# Patient Record
Sex: FEMALE | Race: WHITE | Employment: OTHER | URBAN - METROPOLITAN AREA
[De-identification: names, ages, dates, MRNs, and addresses within clinical notes are randomized per-mention and may not be internally consistent; named-entity substitution may affect disease eponyms.]

---

## 2019-10-22 ENCOUNTER — OFFICE VISIT (OUTPATIENT)
Dept: PRIMARY CARE CLINIC | Age: 69
End: 2019-10-22
Payer: MEDICARE

## 2019-10-22 VITALS
DIASTOLIC BLOOD PRESSURE: 108 MMHG | RESPIRATION RATE: 18 BRPM | SYSTOLIC BLOOD PRESSURE: 182 MMHG | TEMPERATURE: 97.8 F | HEIGHT: 60 IN | BODY MASS INDEX: 26.42 KG/M2 | HEART RATE: 107 BPM | WEIGHT: 134.6 LBS | OXYGEN SATURATION: 98 %

## 2019-10-22 DIAGNOSIS — Z12.11 SCREEN FOR COLON CANCER: ICD-10-CM

## 2019-10-22 DIAGNOSIS — Z91.199 NONCOMPLIANCE: ICD-10-CM

## 2019-10-22 DIAGNOSIS — E78.5 DYSLIPIDEMIA: ICD-10-CM

## 2019-10-22 DIAGNOSIS — Z12.31 SCREENING MAMMOGRAM, ENCOUNTER FOR: ICD-10-CM

## 2019-10-22 DIAGNOSIS — I10 ESSENTIAL HYPERTENSION: Primary | ICD-10-CM

## 2019-10-22 DIAGNOSIS — F41.9 ANXIETY: ICD-10-CM

## 2019-10-22 DIAGNOSIS — E55.9 VITAMIN D DEFICIENCY: ICD-10-CM

## 2019-10-22 DIAGNOSIS — E04.2 MULTINODULAR GOITER: ICD-10-CM

## 2019-10-22 DIAGNOSIS — Z76.89 ENCOUNTER TO ESTABLISH CARE: ICD-10-CM

## 2019-10-22 PROCEDURE — 99203 OFFICE O/P NEW LOW 30 MIN: CPT | Performed by: NURSE PRACTITIONER

## 2019-10-22 RX ORDER — HYDROCHLOROTHIAZIDE 12.5 MG/1
12.5 CAPSULE, GELATIN COATED ORAL EVERY MORNING
Qty: 90 CAPSULE | Refills: 1 | Status: SHIPPED | OUTPATIENT
Start: 2019-10-22 | End: 2020-01-07 | Stop reason: ALTCHOICE

## 2019-10-22 RX ORDER — BLOOD PRESSURE TEST KIT
1 KIT MISCELLANEOUS 2 TIMES DAILY
Qty: 1 KIT | Refills: 0 | Status: SHIPPED | OUTPATIENT
Start: 2019-10-22 | End: 2020-01-07

## 2019-10-22 RX ORDER — LATANOPROST 50 UG/ML
SOLUTION/ DROPS OPHTHALMIC
COMMUNITY
Start: 2019-07-31 | End: 2020-01-07

## 2019-10-22 RX ORDER — AMLODIPINE BESYLATE 5 MG/1
5 TABLET ORAL DAILY
Qty: 90 TABLET | Refills: 1 | Status: SHIPPED | OUTPATIENT
Start: 2019-10-22 | End: 2019-11-06

## 2019-10-22 ASSESSMENT — PATIENT HEALTH QUESTIONNAIRE - PHQ9
SUM OF ALL RESPONSES TO PHQ QUESTIONS 1-9: 0
SUM OF ALL RESPONSES TO PHQ QUESTIONS 1-9: 0
1. LITTLE INTEREST OR PLEASURE IN DOING THINGS: 0
SUM OF ALL RESPONSES TO PHQ9 QUESTIONS 1 & 2: 0
2. FEELING DOWN, DEPRESSED OR HOPELESS: 0

## 2019-10-22 ASSESSMENT — ENCOUNTER SYMPTOMS
ORTHOPNEA: 0
WHEEZING: 0
ANAL BLEEDING: 0
SHORTNESS OF BREATH: 0
DIARRHEA: 0
COUGH: 0
RHINORRHEA: 0
ABDOMINAL PAIN: 0
SINUS PRESSURE: 0
SINUS PAIN: 0
EYE REDNESS: 0
BLURRED VISION: 0
NAUSEA: 0
VOMITING: 0
CONSTIPATION: 0
PHOTOPHOBIA: 0

## 2019-10-25 PROBLEM — I10 ESSENTIAL HYPERTENSION: Status: ACTIVE | Noted: 2019-10-25

## 2019-10-25 PROBLEM — F41.9 ANXIETY: Status: ACTIVE | Noted: 2019-10-25

## 2019-10-25 PROBLEM — E78.5 DYSLIPIDEMIA: Status: ACTIVE | Noted: 2019-10-25

## 2019-10-25 PROBLEM — E55.9 VITAMIN D DEFICIENCY: Status: ACTIVE | Noted: 2019-10-25

## 2019-10-29 ENCOUNTER — TELEPHONE (OUTPATIENT)
Dept: PRIMARY CARE CLINIC | Age: 69
End: 2019-10-29

## 2019-10-29 ENCOUNTER — HOSPITAL ENCOUNTER (OUTPATIENT)
Age: 69
Discharge: HOME | End: 2019-10-29
Payer: MEDICARE

## 2019-10-29 DIAGNOSIS — E87.6 HYPOKALEMIA: Primary | ICD-10-CM

## 2019-10-29 DIAGNOSIS — I10 ESSENTIAL HYPERTENSION: ICD-10-CM

## 2019-10-29 DIAGNOSIS — E04.2 MULTINODULAR GOITER: ICD-10-CM

## 2019-10-29 DIAGNOSIS — Z12.11 SCREEN FOR COLON CANCER: ICD-10-CM

## 2019-10-29 DIAGNOSIS — R19.5 POSITIVE FIT (FECAL IMMUNOCHEMICAL TEST): Primary | ICD-10-CM

## 2019-10-29 DIAGNOSIS — E78.5 DYSLIPIDEMIA: ICD-10-CM

## 2019-10-29 DIAGNOSIS — E55.9 VITAMIN D DEFICIENCY: ICD-10-CM

## 2019-10-29 DIAGNOSIS — F41.9 ANXIETY: ICD-10-CM

## 2019-10-29 LAB
ABSOLUTE EOS #: 0.08 K/UL (ref 0–0.44)
ABSOLUTE IMMATURE GRANULOCYTE: 0.06 K/UL (ref 0–0.3)
ABSOLUTE LYMPH #: 2.09 K/UL (ref 1.1–3.7)
ABSOLUTE MONO #: 0.73 K/UL (ref 0.1–1.2)
ALBUMIN SERPL-MCNC: 4.5 G/DL (ref 3.5–5.2)
ALBUMIN/GLOBULIN RATIO: 1.3 (ref 1–2.5)
ALP BLD-CCNC: 72 U/L (ref 35–104)
ALT SERPL-CCNC: 15 U/L (ref 5–33)
ANION GAP SERPL CALCULATED.3IONS-SCNC: 14 MMOL/L (ref 9–17)
AST SERPL-CCNC: 19 U/L
BASOPHILS # BLD: 1 % (ref 0–2)
BASOPHILS ABSOLUTE: 0.04 K/UL (ref 0–0.2)
BILIRUB SERPL-MCNC: 0.47 MG/DL (ref 0.3–1.2)
BUN BLDV-MCNC: 13 MG/DL (ref 8–23)
BUN/CREAT BLD: 29 (ref 9–20)
CALCIUM SERPL-MCNC: 9.9 MG/DL (ref 8.6–10.4)
CHLORIDE BLD-SCNC: 100 MMOL/L (ref 98–107)
CHOLESTEROL/HDL RATIO: 3.3
CHOLESTEROL: 221 MG/DL
CO2: 27 MMOL/L (ref 20–31)
CONTROL: PRESENT
CREAT SERPL-MCNC: 0.45 MG/DL (ref 0.5–0.9)
DIFFERENTIAL TYPE: ABNORMAL
EOSINOPHILS RELATIVE PERCENT: 1 % (ref 1–4)
GFR AFRICAN AMERICAN: >60 ML/MIN
GFR NON-AFRICAN AMERICAN: >60 ML/MIN
GFR SERPL CREATININE-BSD FRML MDRD: ABNORMAL ML/MIN/{1.73_M2}
GFR SERPL CREATININE-BSD FRML MDRD: ABNORMAL ML/MIN/{1.73_M2}
GLUCOSE BLD-MCNC: 102 MG/DL (ref 70–99)
HCT VFR BLD CALC: 42 % (ref 36.3–47.1)
HDLC SERPL-MCNC: 67 MG/DL
HEMOCCULT STL QL: POSITIVE
HEMOGLOBIN: 13.7 G/DL (ref 11.9–15.1)
IMMATURE GRANULOCYTES: 1 %
LDL CHOLESTEROL: 116 MG/DL (ref 0–130)
LYMPHOCYTES # BLD: 27 % (ref 24–43)
MCH RBC QN AUTO: 31.9 PG (ref 25.2–33.5)
MCHC RBC AUTO-ENTMCNC: 32.6 G/DL (ref 28.4–34.8)
MCV RBC AUTO: 97.7 FL (ref 82.6–102.9)
MONOCYTES # BLD: 9 % (ref 3–12)
NRBC AUTOMATED: 0 PER 100 WBC
PDW BLD-RTO: 12 % (ref 11.8–14.4)
PLATELET # BLD: 241 K/UL (ref 138–453)
PLATELET ESTIMATE: ABNORMAL
PMV BLD AUTO: 10 FL (ref 8.1–13.5)
POTASSIUM SERPL-SCNC: 3.6 MMOL/L (ref 3.7–5.3)
RBC # BLD: 4.3 M/UL (ref 3.95–5.11)
RBC # BLD: ABNORMAL 10*6/UL
SEG NEUTROPHILS: 61 % (ref 36–65)
SEGMENTED NEUTROPHILS ABSOLUTE COUNT: 4.82 K/UL (ref 1.5–8.1)
SODIUM BLD-SCNC: 141 MMOL/L (ref 135–144)
TOTAL PROTEIN: 8.1 G/DL (ref 6.4–8.3)
TRIGL SERPL-MCNC: 191 MG/DL
TSH SERPL DL<=0.05 MIU/L-ACNC: 3.24 MIU/L (ref 0.3–5)
VITAMIN D 25-HYDROXY: 21.1 NG/ML (ref 30–100)
VLDLC SERPL CALC-MCNC: ABNORMAL MG/DL (ref 1–30)
WBC # BLD: 7.8 K/UL (ref 3.5–11.3)
WBC # BLD: ABNORMAL 10*3/UL

## 2019-10-29 PROCEDURE — 80053 COMPREHEN METABOLIC PANEL: CPT

## 2019-10-29 PROCEDURE — 82306 VITAMIN D 25 HYDROXY: CPT

## 2019-10-29 PROCEDURE — 84443 ASSAY THYROID STIM HORMONE: CPT

## 2019-10-29 PROCEDURE — 85025 COMPLETE CBC W/AUTO DIFF WBC: CPT

## 2019-10-29 PROCEDURE — 82274 ASSAY TEST FOR BLOOD FECAL: CPT | Performed by: NURSE PRACTITIONER

## 2019-10-29 PROCEDURE — 36415 COLL VENOUS BLD VENIPUNCTURE: CPT

## 2019-10-29 PROCEDURE — 80061 LIPID PANEL: CPT

## 2019-10-29 RX ORDER — SODIUM, POTASSIUM,MAG SULFATES 17.5-3.13G
SOLUTION, RECONSTITUTED, ORAL ORAL
Qty: 2 BOTTLE | Refills: 0 | Status: SHIPPED | OUTPATIENT
Start: 2019-10-29 | End: 2020-01-07 | Stop reason: ALTCHOICE

## 2019-10-30 ENCOUNTER — TELEPHONE (OUTPATIENT)
Dept: PRIMARY CARE CLINIC | Age: 69
End: 2019-10-30

## 2019-10-30 RX ORDER — VITAMIN B COMPLEX
1000 TABLET ORAL DAILY
Qty: 90 TABLET | Refills: 1 | Status: SHIPPED | OUTPATIENT
Start: 2019-10-30 | End: 2021-09-23

## 2019-11-06 ENCOUNTER — OFFICE VISIT (OUTPATIENT)
Dept: PRIMARY CARE CLINIC | Age: 69
End: 2019-11-06
Payer: MEDICARE

## 2019-11-06 VITALS
TEMPERATURE: 97.8 F | RESPIRATION RATE: 20 BRPM | HEIGHT: 60 IN | WEIGHT: 134.3 LBS | HEART RATE: 96 BPM | SYSTOLIC BLOOD PRESSURE: 156 MMHG | BODY MASS INDEX: 26.37 KG/M2 | DIASTOLIC BLOOD PRESSURE: 97 MMHG

## 2019-11-06 DIAGNOSIS — Z00.00 ROUTINE GENERAL MEDICAL EXAMINATION AT A HEALTH CARE FACILITY: Primary | ICD-10-CM

## 2019-11-06 DIAGNOSIS — I10 ESSENTIAL HYPERTENSION: ICD-10-CM

## 2019-11-06 PROCEDURE — G0438 PPPS, INITIAL VISIT: HCPCS | Performed by: NURSE PRACTITIONER

## 2019-11-06 RX ORDER — AMLODIPINE BESYLATE 10 MG/1
10 TABLET ORAL DAILY
Qty: 90 TABLET | Refills: 1 | Status: SHIPPED | OUTPATIENT
Start: 2019-11-06 | End: 2020-05-04

## 2019-11-06 ASSESSMENT — PATIENT HEALTH QUESTIONNAIRE - PHQ9
SUM OF ALL RESPONSES TO PHQ QUESTIONS 1-9: 0

## 2019-11-06 ASSESSMENT — LIFESTYLE VARIABLES: HOW OFTEN DO YOU HAVE A DRINK CONTAINING ALCOHOL: 0

## 2019-12-05 ENCOUNTER — TELEPHONE (OUTPATIENT)
Dept: PRIMARY CARE CLINIC | Age: 69
End: 2019-12-05

## 2019-12-05 ENCOUNTER — HOSPITAL ENCOUNTER (OUTPATIENT)
Dept: WOMENS IMAGING | Age: 69
Discharge: HOME | End: 2019-12-07
Payer: MEDICARE

## 2019-12-05 ENCOUNTER — APPOINTMENT (OUTPATIENT)
Dept: WOMENS IMAGING | Age: 69
End: 2019-12-05
Payer: MEDICARE

## 2019-12-05 DIAGNOSIS — Z12.31 SCREENING MAMMOGRAM, ENCOUNTER FOR: ICD-10-CM

## 2019-12-05 PROCEDURE — 77063 BREAST TOMOSYNTHESIS BI: CPT

## 2020-01-07 ENCOUNTER — OFFICE VISIT (OUTPATIENT)
Dept: PRIMARY CARE CLINIC | Age: 70
End: 2020-01-07
Payer: MEDICARE

## 2020-01-07 VITALS
BODY MASS INDEX: 26.8 KG/M2 | SYSTOLIC BLOOD PRESSURE: 124 MMHG | HEIGHT: 60 IN | WEIGHT: 136.5 LBS | DIASTOLIC BLOOD PRESSURE: 71 MMHG | HEART RATE: 91 BPM | TEMPERATURE: 98.7 F | RESPIRATION RATE: 16 BRPM

## 2020-01-07 PROCEDURE — 99214 OFFICE O/P EST MOD 30 MIN: CPT | Performed by: NURSE PRACTITIONER

## 2020-01-07 RX ORDER — LEVOTHYROXINE SODIUM 0.07 MG/1
TABLET ORAL
COMMUNITY
Start: 2019-11-22

## 2020-01-07 RX ORDER — TIMOLOL MALEATE 5 MG/ML
SOLUTION/ DROPS OPHTHALMIC
COMMUNITY
Start: 2019-12-06 | End: 2021-09-23

## 2020-01-07 ASSESSMENT — PATIENT HEALTH QUESTIONNAIRE - PHQ9
2. FEELING DOWN, DEPRESSED OR HOPELESS: 0
SUM OF ALL RESPONSES TO PHQ9 QUESTIONS 1 & 2: 0
SUM OF ALL RESPONSES TO PHQ QUESTIONS 1-9: 0
1. LITTLE INTEREST OR PLEASURE IN DOING THINGS: 0
SUM OF ALL RESPONSES TO PHQ QUESTIONS 1-9: 0

## 2020-01-07 ASSESSMENT — ENCOUNTER SYMPTOMS
ABDOMINAL PAIN: 0
NAUSEA: 0
SHORTNESS OF BREATH: 0
TROUBLE SWALLOWING: 0
SORE THROAT: 1
DIARRHEA: 0
SINUS PAIN: 0
VOMITING: 0
COUGH: 1
WHEEZING: 0
RHINORRHEA: 1
SINUS PRESSURE: 0

## 2020-01-07 NOTE — PATIENT INSTRUCTIONS
stroke. But taking aspirin isn't right for everyone, because it can cause serious bleeding. · See your doctor regularly. You may need to see the doctor more often at first or until your blood pressure comes down. · If you are taking blood pressure medicine, talk to your doctor before you take decongestants or anti-inflammatory medicine, such as ibuprofen. Some of these medicines can raise blood pressure. · Learn how to check your blood pressure at home. Lifestyle changes  · Stay at a healthy weight. This is especially important if you put on weight around the waist. Losing even 10 pounds can help you lower your blood pressure. · If your doctor recommends it, get more exercise. Walking is a good choice. Bit by bit, increase the amount you walk every day. Try for at least 30 minutes on most days of the week. You also may want to swim, bike, or do other activities. · Avoid or limit alcohol. Talk to your doctor about whether you can drink any alcohol. · Try to limit how much sodium you eat to less than 2,300 milligrams (mg) a day. Your doctor may ask you to try to eat less than 1,500 mg a day. · Eat plenty of fruits (such as bananas and oranges), vegetables, legumes, whole grains, and low-fat dairy products. · Lower the amount of saturated fat in your diet. Saturated fat is found in animal products such as milk, cheese, and meat. Limiting these foods may help you lose weight and also lower your risk for heart disease. · Do not smoke. Smoking increases your risk for heart attack and stroke. If you need help quitting, talk to your doctor about stop-smoking programs and medicines. These can increase your chances of quitting for good. When should you call for help? Call 911 anytime you think you may need emergency care. This may mean having symptoms that suggest that your blood pressure is causing a serious heart or blood vessel problem.  Your blood pressure may be over 180/120.   For example, call 911 if:    · You have symptoms of a heart attack. These may include:  ? Chest pain or pressure, or a strange feeling in the chest.  ? Sweating. ? Shortness of breath. ? Nausea or vomiting. ? Pain, pressure, or a strange feeling in the back, neck, jaw, or upper belly or in one or both shoulders or arms. ? Lightheadedness or sudden weakness. ? A fast or irregular heartbeat.     · You have symptoms of a stroke. These may include:  ? Sudden numbness, tingling, weakness, or loss of movement in your face, arm, or leg, especially on only one side of your body. ? Sudden vision changes. ? Sudden trouble speaking. ? Sudden confusion or trouble understanding simple statements. ? Sudden problems with walking or balance. ? A sudden, severe headache that is different from past headaches.     · You have severe back or belly pain.    Do not wait until your blood pressure comes down on its own. Get help right away.   Call your doctor now or seek immediate care if:    · Your blood pressure is much higher than normal (such as 180/120 or higher), but you don't have symptoms.     · You think high blood pressure is causing symptoms, such as:  ? Severe headache.  ? Blurry vision.    Watch closely for changes in your health, and be sure to contact your doctor if:    · Your blood pressure measures higher than your doctor recommends at least 2 times. That means the top number is higher or the bottom number is higher, or both.     · You think you may be having side effects from your blood pressure medicine. Where can you learn more? Go to https://[employer].intelloCut. org and sign in to your [employer] account. Enter H000 in the [facility] box to learn more about \"High Blood Pressure: Care Instructions. \"     If you do not have an account, please click on the \"Sign Up Now\" link. Current as of: July 22, 2018  Content Version: 12.1  © 1998-3311 Healthwise, mascotsecret.  Care instructions adapted under license by [id number] [employer]

## 2020-01-22 ENCOUNTER — NURSE ONLY (OUTPATIENT)
Dept: PRIMARY CARE CLINIC | Age: 70
End: 2020-01-22

## 2020-01-22 VITALS — DIASTOLIC BLOOD PRESSURE: 84 MMHG | SYSTOLIC BLOOD PRESSURE: 120 MMHG | HEART RATE: 68 BPM

## 2020-05-04 RX ORDER — HYDROCHLOROTHIAZIDE 12.5 MG/1
CAPSULE, GELATIN COATED ORAL
Qty: 90 CAPSULE | Refills: 0 | OUTPATIENT
Start: 2020-05-04

## 2020-05-04 RX ORDER — AMLODIPINE BESYLATE 10 MG/1
TABLET ORAL
Qty: 90 TABLET | Refills: 3 | Status: SHIPPED | OUTPATIENT
Start: 2020-05-04 | End: 2020-07-08

## 2020-05-04 NOTE — TELEPHONE ENCOUNTER
The HCTZ is marked therapy completed on medication list.  Request was sent by pharmacy and can not remove with out signing the other order.                Health Maintenance   Topic Date Due    Pneumococcal 65+ years Vaccine (1 of 1 - PPSV23) 10/02/2020 (Originally 2/7/2017)    DEXA (modify frequency per FRAX score)  10/22/2020 (Originally 2/7/2007)    DTaP/Tdap/Td vaccine (1 - Tdap) 10/22/2020 (Originally 2/7/1971)    Flu vaccine (Season Ended) 10/22/2020 (Originally 9/1/2020)    Shingles Vaccine (1 of 2) 10/22/2020 (Originally 2/7/2002)    Hepatitis C screen  10/22/2018 (Originally 1950)    Diabetes screen  11/06/2020 (Originally 2/7/1992)    Colon Cancer Screen FIT/FOBT  10/29/2020    Potassium monitoring  10/29/2020    Creatinine monitoring  10/29/2020    Annual Wellness Visit (AWV)  11/06/2020    Breast cancer screen  12/05/2021    Lipid screen  10/29/2024    Hepatitis A vaccine  Aged Out    Hepatitis B vaccine  Aged Out    Hib vaccine  Aged Out    Meningococcal (ACWY) vaccine  Aged Out             (applicable per patient's age: Cancer Screenings, Depression Screening, Fall Risk Screening, Immunizations)    LDL Cholesterol (mg/dL)   Date Value   10/29/2019 116     AST (U/L)   Date Value   10/29/2019 19     ALT (U/L)   Date Value   10/29/2019 15     BUN (mg/dL)   Date Value   10/29/2019 13      (goal A1C is < 7)   (goal LDL is <100) need 30-50% reduction from baseline     BP Readings from Last 3 Encounters:   01/22/20 120/84   01/07/20 124/71   11/06/19 (!) 156/97    (goal /80)      All Future Testing planned in [facility]:  Lab Frequency Next Occurrence   COLONOSCOPY W/ OR W/O BIOPSY Once 10/29/2019   CBC Auto Differential Once 07/05/2020   ALT Once 07/07/2020   AST Once 03/67/0832   Basic Metabolic Panel Once 81/20/7350   Lipid Panel Once 07/05/2020   T4, Free Once 07/05/2020   TSH without Reflex Once 07/05/2020       Next Visit Date:  Future Appointments   Date Time Provider Department [place]   7/7/2020  8:40 AM CHRISTIANO Boogie CNP [facility]   11/10/2020  9:00 AM CHRISTIANO Boogie CNP [facility] [place]            Patient Active Problem List:     Multinodular goiter     Hypertriglyceridemia     Chondromalacia of knee     Elevated blood pressure reading     Essential hypertension     Dyslipidemia     Vitamin D deficiency     Anxiety     Positive FIT (fecal immunochemical test)

## 2020-06-29 ENCOUNTER — TELEPHONE (OUTPATIENT)
Dept: PRIMARY CARE CLINIC | Age: 70
End: 2020-06-29

## 2020-06-29 NOTE — TELEPHONE ENCOUNTER
Attempted to call patient regarding need to get fasting albs done for upcoming appt. No answer and no voice mail.

## 2020-07-08 ENCOUNTER — OFFICE VISIT (OUTPATIENT)
Dept: PRIMARY CARE CLINIC | Age: 70
End: 2020-07-08
Payer: MEDICARE

## 2020-07-08 VITALS
WEIGHT: 136.4 LBS | RESPIRATION RATE: 18 BRPM | DIASTOLIC BLOOD PRESSURE: 83 MMHG | SYSTOLIC BLOOD PRESSURE: 127 MMHG | HEART RATE: 98 BPM | TEMPERATURE: 97.8 F | BODY MASS INDEX: 26.64 KG/M2

## 2020-07-08 PROCEDURE — 99214 OFFICE O/P EST MOD 30 MIN: CPT | Performed by: NURSE PRACTITIONER

## 2020-07-08 RX ORDER — BENAZEPRIL HYDROCHLORIDE 20 MG/1
20 TABLET ORAL DAILY
Qty: 90 TABLET | Refills: 3 | Status: SHIPPED | OUTPATIENT
Start: 2020-07-08 | End: 2020-09-17

## 2020-07-08 ASSESSMENT — ENCOUNTER SYMPTOMS
ABDOMINAL PAIN: 0
SHORTNESS OF BREATH: 0
WHEEZING: 0
DIARRHEA: 0
VOMITING: 0
COUGH: 0
SORE THROAT: 0
RHINORRHEA: 0
NAUSEA: 0
CONSTIPATION: 0

## 2020-07-08 NOTE — PATIENT INSTRUCTIONS
SURVEY:    You may be receiving a survey from [employer] regarding your visit today. Please complete the survey to enable us to provide the highest quality of care to you and your family. If you cannot score us a very good on any question, please call the office to discuss how we could have made your experience a very good one. Thank you. Patient Education        High Blood Pressure: Care Instructions  Overview     It's normal for blood pressure to go up and down throughout the day. But if it stays up, you have high blood pressure. Another name for high blood pressure is hypertension. Despite what a lot of people think, high blood pressure usually doesn't cause headaches or make you feel dizzy or lightheaded. It usually has no symptoms. But it does increase your risk of stroke, heart attack, and other problems. You and your doctor will talk about your risks of these problems based on your blood pressure. Your doctor will give you a goal for your blood pressure. Your goal will be based on your health and your age. Lifestyle changes, such as eating healthy and being active, are always important to help lower blood pressure. You might also take medicine to reach your blood pressure goal.  Follow-up care is a key part of your treatment and safety. Be sure to make and go to all appointments, and call your doctor if you are having problems. It's also a good idea to know your test results and keep a list of the medicines you take. How can you care for yourself at home? Medical treatment  · If you stop taking your medicine, your blood pressure will go back up. You may take one or more types of medicine to lower your blood pressure. Be safe with medicines. Take your medicine exactly as prescribed. Call your doctor if you think you are having a problem with your medicine. · Talk to your doctor before you start taking aspirin every day.  Aspirin can help certain people lower their risk of a heart attack or if:  · You have symptoms of a heart attack. These may include:  ? Chest pain or pressure, or a strange feeling in the chest.  ? Sweating. ? Shortness of breath. ? Nausea or vomiting. ? Pain, pressure, or a strange feeling in the back, neck, jaw, or upper belly or in one or both shoulders or arms. ? Lightheadedness or sudden weakness. ? A fast or irregular heartbeat. · You have symptoms of a stroke. These may include:  ? Sudden numbness, tingling, weakness, or loss of movement in your face, arm, or leg, especially on only one side of your body. ? Sudden vision changes. ? Sudden trouble speaking. ? Sudden confusion or trouble understanding simple statements. ? Sudden problems with walking or balance. ? A sudden, severe headache that is different from past headaches. · You have severe back or belly pain. Do not wait until your blood pressure comes down on its own. Get help right away. Call your doctor now or seek immediate care if:  · Your blood pressure is much higher than normal (such as 180/120 or higher), but you don't have symptoms. · You think high blood pressure is causing symptoms, such as:  ? Severe headache.  ? Blurry vision. Watch closely for changes in your health, and be sure to contact your doctor if:  · Your blood pressure measures higher than your doctor recommends at least 2 times. That means the top number is higher or the bottom number is higher, or both. · You think you may be having side effects from your blood pressure medicine. Where can you learn more? Go to https://TaodyneluiJounce.Oobafit. org and sign in to your [employer] account. Enter M850 in the [employer] box to learn more about \"High Blood Pressure: Care Instructions. \"     If you do not have an account, please click on the \"Sign Up Now\" link. Current as of: December 16, 2019               Content Version: 12.5  © 7470-7180 Healthwise, Incorporated.    Care instructions adapted under license by [facility]. If you have questions about a medical condition or this instruction, always ask your healthcare professional. [place] [address] any warranty or liability for your use of this information.

## 2020-07-08 NOTE — PROGRESS NOTES
Name: Chantel Sales  : 1950         Chief Complaint:     Chief Complaint   Patient presents with    Hypertension     routine check       History of Present Illness:      Chantel Sales is a 78 y.o.  female who presents with Hypertension (routine check)      Maira Khan is here today for a routine office visit. Hypertension-stable, patient wishes to stop amlodipine therapy. She states she is continuing with lower leg edema and body aches. She would like to switch to \"lisinopril\". She does state that this medication is known to cause cough but she would like to try something like this medication instead of the amlodipine. Hypothyroidism-stable, patient compliant with her medication administration. Denies any excessive fatigue daytime sleepiness, insomnia, anxiety, or agitation. Most recent labs were within normal limits. Hypertension   This is a chronic problem. The current episode started more than 1 year ago. The problem is unchanged. The problem is controlled. Pertinent negatives include no anxiety, chest pain, headaches, neck pain, palpitations or shortness of breath. There are no associated agents to hypertension. Risk factors for coronary artery disease include stress, post-menopausal state and family history. Past treatments include calcium channel blockers. The current treatment provides moderate improvement. There are no compliance problems. There is no history of kidney disease, CAD/MI, CVA or heart failure. There is no history of chronic renal disease. Past Medical History:     Past Medical History:   Diagnosis Date    GERD (gastroesophageal reflux disease)     Hypertension       Reviewed all health maintenance requirements and ordered appropriate tests  There are no preventive care reminders to display for this patient.     Past Surgical History:     Past Surgical History:   Procedure Laterality Date    BREAST BIOPSY      TONSILLECTOMY      TUBAL LIGATION          Medications: Prior to Admission medications    Medication Sig Start Date End Date Taking? Authorizing Provider   benazepril (LOTENSIN) 20 MG tablet Take 1 tablet by mouth daily 7/8/20  Yes CHRISTIANO Cruz CNP   levothyroxine (SYNTHROID) 75 MCG tablet TAKE 1 TABLET BY MOUTH ONCE DAILY ON AN EMPTY STOMACH 30 MINUTES BEFORE BREAKFAST WITH WATER ONLY 11/22/19  Yes Historical Provider, MD   Vitamin D (CHOLECALCIFEROL) 25 MCG (1000 UT) TABS tablet Take 1 tablet by mouth daily 10/30/19  Yes CHRISTIANO Santos CNP   timolol (TIMOPTIC) 0.5 % ophthalmic solution INSTILL 1 DROP INTO RIGHT EYE TWICE DAILY 12/6/19   Historical Provider, MD   Selenium 200 MCG TABS Take 1 tablet by mouth daily    Historical Provider, MD        Allergies:       Patient has no known allergies. Social History:     Tobacco:    reports that she has never smoked. She has never used smokeless tobacco.  Alcohol:      reports no history of alcohol use. Drug Use:  reports no history of drug use. Family History:     Family History   Problem Relation Age of Onset    High Blood Pressure Father     High Blood Pressure Sister     High Blood Pressure Other        Review of Systems:     Positive and Negative as described in HPI    Review of Systems   Constitutional: Negative for chills, fatigue and fever. HENT: Negative for congestion, rhinorrhea and sore throat. Eyes: Negative for visual disturbance. Respiratory: Negative for cough, shortness of breath and wheezing. Cardiovascular: Positive for leg swelling. Negative for chest pain and palpitations. Gastrointestinal: Negative for abdominal pain, constipation, diarrhea, nausea and vomiting. Genitourinary: Negative for difficulty urinating and dysuria. Musculoskeletal: Positive for myalgias. Negative for gait problem, neck pain and neck stiffness. Skin: Negative for rash. Neurological: Negative for dizziness, syncope, light-headedness and headaches.        Physical Exam:   Vitals:  BP 127/83 (Site: Left Upper Arm, Position: Sitting, Cuff Size: Medium Adult)   Pulse 98   Temp 97.8 °F (36.6 °C) (Temporal)   Resp 18   Wt 136 lb 6.4 oz (61.9 kg)   BMI 26.64 kg/m²     Physical Exam  Vitals signs and nursing note reviewed. Constitutional:       General: She is not in acute distress. Appearance: Normal appearance. She is not ill-appearing. HENT:      Right Ear: Tympanic membrane normal.      Left Ear: Tympanic membrane normal.      Mouth/Throat:      Mouth: Mucous membranes are moist.   Eyes:      General: No scleral icterus. Conjunctiva/sclera: Conjunctivae normal.   Neck:      Musculoskeletal: Normal range of motion and neck supple. Cardiovascular:      Rate and Rhythm: Normal rate and regular rhythm. Heart sounds: No murmur. Pulmonary:      Effort: Pulmonary effort is normal.      Breath sounds: Normal breath sounds. No wheezing. Abdominal:      General: Bowel sounds are normal. There is no distension. Palpations: Abdomen is soft. Tenderness: There is no abdominal tenderness. Musculoskeletal:      Right lower leg: No edema. Left lower leg: No edema. Lymphadenopathy:      Cervical: No cervical adenopathy. Skin:     General: Skin is warm and dry. Findings: No erythema. Neurological:      Mental Status: She is alert and oriented to person, place, and time.    Psychiatric:         Mood and Affect: Mood normal.         Behavior: Behavior normal.         Data:     Lab Results   Component Value Date     10/29/2019    K 3.6 10/29/2019     10/29/2019    CO2 27 10/29/2019    BUN 13 10/29/2019    CREATININE 0.45 10/29/2019    GLUCOSE 102 10/29/2019    PROT 8.1 10/29/2019    LABALBU 4.5 10/29/2019    BILITOT 0.47 10/29/2019    ALKPHOS 72 10/29/2019    AST 19 10/29/2019    ALT 15 10/29/2019     Lab Results   Component Value Date    WBC 7.8 10/29/2019    RBC 4.30 10/29/2019    HGB 13.7 10/29/2019    HCT 42.0 10/29/2019    MCV 97.7 10/29/2019    [facility]

## 2020-07-13 ENCOUNTER — TELEPHONE (OUTPATIENT)
Dept: PRIMARY CARE CLINIC | Age: 70
End: 2020-07-13

## 2020-07-16 LAB
ALT SERPL-CCNC: 15 U/L (ref 0–31)
ANION GAP SERPL CALCULATED.3IONS-SCNC: 12 MMOL/L (ref 5–15)
AST SERPL-CCNC: 19 U/L (ref 0–41)
BUN BLDV-MCNC: 11 MG/DL (ref 5–27)
CALCIUM SERPL-MCNC: 9.2 MG/DL (ref 8.5–10.5)
CHLORIDE BLD-SCNC: 103 MMOL/L (ref 98–109)
CHOLESTEROL/HDL RATIO: 3.9 (ref 1–5)
CHOLESTEROL: 239 MG/DL (ref 150–200)
CO2: 26 MMOL/L (ref 22–32)
CREAT SERPL-MCNC: 0.51 MG/DL (ref 0.4–1)
EGFR AFRICAN AMERICAN: >60 ML/MIN/1.73SQ.M
EGFR IF NONAFRICAN AMERICAN: >60 ML/MIN/1.73SQ.M
GLUCOSE: 95 MG/DL (ref 65–99)
HDLC SERPL-MCNC: 61 MG/DL
LDL CHOLESTEROL CALCULATED: 150 MG/DL
LDL/HDL RATIO: 2.5
POTASSIUM SERPL-SCNC: 3.9 MMOL/L (ref 3.5–5)
SODIUM BLD-SCNC: 141 MMOL/L (ref 134–146)
T4 FREE: 1.13 NG/DL (ref 0.61–1.6)
TRIGL SERPL-MCNC: 142 MG/DL (ref 27–150)
TSH SERPL DL<=0.05 MIU/L-ACNC: 0.08 UIU/ML (ref 0.49–4.67)
VLDLC SERPL CALC-MCNC: 28 MG/DL (ref 0–30)

## 2020-07-17 ENCOUNTER — TELEPHONE (OUTPATIENT)
Dept: PRIMARY CARE CLINIC | Age: 70
End: 2020-07-17

## 2020-07-17 NOTE — TELEPHONE ENCOUNTER
----- Message from [address] [place] [address], CHRISTIANO - CNP sent at 7/17/2020 11:39 AM EDT -----  Results are stable, please call patient and make them aware.

## 2020-07-22 ENCOUNTER — NURSE ONLY (OUTPATIENT)
Dept: PRIMARY CARE CLINIC | Age: 70
End: 2020-07-22

## 2020-07-22 VITALS
HEIGHT: 60 IN | HEART RATE: 82 BPM | RESPIRATION RATE: 16 BRPM | DIASTOLIC BLOOD PRESSURE: 79 MMHG | SYSTOLIC BLOOD PRESSURE: 143 MMHG | BODY MASS INDEX: 26.64 KG/M2 | TEMPERATURE: 97.6 F

## 2020-07-28 ENCOUNTER — TELEPHONE (OUTPATIENT)
Dept: PRIMARY CARE CLINIC | Age: 70
End: 2020-07-28

## 2020-07-30 ENCOUNTER — TELEPHONE (OUTPATIENT)
Dept: PRIMARY CARE CLINIC | Age: 70
End: 2020-07-30

## 2020-07-30 NOTE — TELEPHONE ENCOUNTER
Patient was ordered CBC on 1/7/2020 has not been completed, patient has been notified. CBC was not completed last lab draw. D/C order?

## 2020-08-21 ENCOUNTER — NURSE ONLY (OUTPATIENT)
Dept: PRIMARY CARE CLINIC | Age: 70
End: 2020-08-21

## 2020-08-21 VITALS
SYSTOLIC BLOOD PRESSURE: 152 MMHG | HEART RATE: 82 BPM | TEMPERATURE: 97.7 F | BODY MASS INDEX: 26.72 KG/M2 | WEIGHT: 136.1 LBS | DIASTOLIC BLOOD PRESSURE: 98 MMHG | HEIGHT: 60 IN

## 2020-09-04 ENCOUNTER — NURSE ONLY (OUTPATIENT)
Dept: PRIMARY CARE CLINIC | Age: 70
End: 2020-09-04

## 2020-09-04 ENCOUNTER — TELEPHONE (OUTPATIENT)
Dept: PRIMARY CARE CLINIC | Age: 70
End: 2020-09-04

## 2020-09-04 VITALS
SYSTOLIC BLOOD PRESSURE: 161 MMHG | TEMPERATURE: 97.9 F | DIASTOLIC BLOOD PRESSURE: 101 MMHG | RESPIRATION RATE: 16 BRPM | HEART RATE: 102 BPM

## 2020-09-04 RX ORDER — CARVEDILOL 12.5 MG/1
12.5 TABLET ORAL 2 TIMES DAILY
Qty: 60 TABLET | Refills: 0 | Status: SHIPPED | OUTPATIENT
Start: 2020-09-04 | End: 2020-09-17

## 2020-09-04 NOTE — TELEPHONE ENCOUNTER
Patient comes back to the office after her blood pressure check this morning and does not want to take the  Coreg and benazepril as directed. Patient states she does not like how she feels taking the benazepril and verapamil and would like to go back to taking the amlodipine and hctz because of her swelling. Writer spoke with Karis Knight CNP and he advised for pt to take the benazepril and the coreg as prescribed because she made the decision to discontinue the amlodipine. Patient states she has some of the hctz and amlodipine left over and she will just take that and nothing else and will come back in 2 weeks for her bp check. Channing Knight CNP advised.

## 2020-09-17 ENCOUNTER — NURSE ONLY (OUTPATIENT)
Dept: PRIMARY CARE CLINIC | Age: 70
End: 2020-09-17

## 2020-09-17 VITALS
RESPIRATION RATE: 18 BRPM | DIASTOLIC BLOOD PRESSURE: 75 MMHG | TEMPERATURE: 97.6 F | HEART RATE: 132 BPM | SYSTOLIC BLOOD PRESSURE: 134 MMHG

## 2020-09-17 RX ORDER — AMLODIPINE BESYLATE 10 MG/1
10 TABLET ORAL DAILY
COMMUNITY
End: 2020-10-09 | Stop reason: SDUPTHER

## 2020-09-30 ENCOUNTER — NURSE ONLY (OUTPATIENT)
Dept: PRIMARY CARE CLINIC | Age: 70
End: 2020-09-30

## 2020-09-30 VITALS
RESPIRATION RATE: 20 BRPM | DIASTOLIC BLOOD PRESSURE: 88 MMHG | SYSTOLIC BLOOD PRESSURE: 134 MMHG | TEMPERATURE: 98.1 F | HEART RATE: 112 BPM

## 2020-09-30 RX ORDER — HYDROCHLOROTHIAZIDE 12.5 MG/1
CAPSULE, GELATIN COATED ORAL
COMMUNITY
End: 2020-10-09 | Stop reason: SDUPTHER

## 2020-09-30 NOTE — PATIENT INSTRUCTIONS
SURVEY:    You may be receiving a survey from [employer] regarding your visit today. Please complete the survey to enable us to provide the highest quality of care to you and your family. If you cannot score us a very good on any question, please call the office to discuss how we could have made your experience a very good one. Thank you. Patient Education        High Blood Pressure: Care Instructions  Overview     It's normal for blood pressure to go up and down throughout the day. But if it stays up, you have high blood pressure. Another name for high blood pressure is hypertension. Despite what a lot of people think, high blood pressure usually doesn't cause headaches or make you feel dizzy or lightheaded. It usually has no symptoms. But it does increase your risk of stroke, heart attack, and other problems. You and your doctor will talk about your risks of these problems based on your blood pressure. Your doctor will give you a goal for your blood pressure. Your goal will be based on your health and your age. Lifestyle changes, such as eating healthy and being active, are always important to help lower blood pressure. You might also take medicine to reach your blood pressure goal.  Follow-up care is a key part of your treatment and safety. Be sure to make and go to all appointments, and call your doctor if you are having problems. It's also a good idea to know your test results and keep a list of the medicines you take. How can you care for yourself at home? Medical treatment  · If you stop taking your medicine, your blood pressure will go back up. You may take one or more types of medicine to lower your blood pressure. Be safe with medicines. Take your medicine exactly as prescribed. Call your doctor if you think you are having a problem with your medicine. · Talk to your doctor before you start taking aspirin every day.  Aspirin can help certain people lower their risk of a heart attack or stroke. But taking aspirin isn't right for everyone, because it can cause serious bleeding. · See your doctor regularly. You may need to see the doctor more often at first or until your blood pressure comes down. · If you are taking blood pressure medicine, talk to your doctor before you take decongestants or anti-inflammatory medicine, such as ibuprofen. Some of these medicines can raise blood pressure. · Learn how to check your blood pressure at home. Lifestyle changes  · Stay at a healthy weight. This is especially important if you put on weight around the waist. Losing even 10 pounds can help you lower your blood pressure. · If your doctor recommends it, get more exercise. Walking is a good choice. Bit by bit, increase the amount you walk every day. Try for at least 30 minutes on most days of the week. You also may want to swim, bike, or do other activities. · Avoid or limit alcohol. Talk to your doctor about whether you can drink any alcohol. · Try to limit how much sodium you eat to less than 2,300 milligrams (mg) a day. Your doctor may ask you to try to eat less than 1,500 mg a day. · Eat plenty of fruits (such as bananas and oranges), vegetables, legumes, whole grains, and low-fat dairy products. · Lower the amount of saturated fat in your diet. Saturated fat is found in animal products such as milk, cheese, and meat. Limiting these foods may help you lose weight and also lower your risk for heart disease. · Do not smoke. Smoking increases your risk for heart attack and stroke. If you need help quitting, talk to your doctor about stop-smoking programs and medicines. These can increase your chances of quitting for good. When should you call for help? Call  911 anytime you think you may need emergency care. This may mean having symptoms that suggest that your blood pressure is causing a serious heart or blood vessel problem. Your blood pressure may be over 180/120.   For example, call 911 if:  · You have symptoms of a heart attack. These may include:  ? Chest pain or pressure, or a strange feeling in the chest.  ? Sweating. ? Shortness of breath. ? Nausea or vomiting. ? Pain, pressure, or a strange feeling in the back, neck, jaw, or upper belly or in one or both shoulders or arms. ? Lightheadedness or sudden weakness. ? A fast or irregular heartbeat. · You have symptoms of a stroke. These may include:  ? Sudden numbness, tingling, weakness, or loss of movement in your face, arm, or leg, especially on only one side of your body. ? Sudden vision changes. ? Sudden trouble speaking. ? Sudden confusion or trouble understanding simple statements. ? Sudden problems with walking or balance. ? A sudden, severe headache that is different from past headaches. · You have severe back or belly pain. Do not wait until your blood pressure comes down on its own. Get help right away. Call your doctor now or seek immediate care if:  · Your blood pressure is much higher than normal (such as 180/120 or higher), but you don't have symptoms. · You think high blood pressure is causing symptoms, such as:  ? Severe headache.  ? Blurry vision. Watch closely for changes in your health, and be sure to contact your doctor if:  · Your blood pressure measures higher than your doctor recommends at least 2 times. That means the top number is higher or the bottom number is higher, or both. · You think you may be having side effects from your blood pressure medicine. Where can you learn more? Go to https://[employer]ess[employer] Systems.quietrevolution. org and sign in to your [employer] account. Enter Z263 in the [employer] box to learn more about \"High Blood Pressure: Care Instructions. \"     If you do not have an account, please click on the \"Sign Up Now\" link. Current as of: December 16, 2019               Content Version: 12.5  © 7178-3169 Healthwise, Incorporated.    Care instructions adapted under license by [facility]. If you have questions about a medical condition or this instruction, always ask your healthcare professional. [place] [address] any warranty or liability for your use of this information.

## 2020-10-09 RX ORDER — AMLODIPINE BESYLATE 10 MG/1
10 TABLET ORAL DAILY
Qty: 90 TABLET | Refills: 1 | Status: SHIPPED | OUTPATIENT
Start: 2020-10-09 | End: 2021-01-11

## 2020-10-09 RX ORDER — HYDROCHLOROTHIAZIDE 12.5 MG/1
12.5 CAPSULE, GELATIN COATED ORAL EVERY MORNING
Qty: 90 CAPSULE | Refills: 1 | Status: SHIPPED | OUTPATIENT
Start: 2020-10-09 | End: 2021-01-11

## 2020-10-09 NOTE — TELEPHONE ENCOUNTER
Chondromalacia of knee     Elevated blood pressure reading     Essential hypertension     Dyslipidemia     Vitamin D deficiency     Anxiety     Positive FIT (fecal immunochemical test)

## 2021-01-11 ENCOUNTER — OFFICE VISIT (OUTPATIENT)
Dept: PRIMARY CARE CLINIC | Age: 71
End: 2021-01-11
Payer: MEDICARE

## 2021-01-11 VITALS
RESPIRATION RATE: 22 BRPM | BODY MASS INDEX: 26.93 KG/M2 | HEART RATE: 88 BPM | TEMPERATURE: 98.2 F | OXYGEN SATURATION: 99 % | HEIGHT: 60 IN | SYSTOLIC BLOOD PRESSURE: 122 MMHG | DIASTOLIC BLOOD PRESSURE: 78 MMHG | WEIGHT: 137.2 LBS

## 2021-01-11 DIAGNOSIS — I10 ESSENTIAL HYPERTENSION: Primary | ICD-10-CM

## 2021-01-11 DIAGNOSIS — E03.1 CONGENITAL HYPOTHYROIDISM WITHOUT GOITER: ICD-10-CM

## 2021-01-11 DIAGNOSIS — I10 ESSENTIAL HYPERTENSION: ICD-10-CM

## 2021-01-11 PROCEDURE — 99214 OFFICE O/P EST MOD 30 MIN: CPT | Performed by: NURSE PRACTITIONER

## 2021-01-11 RX ORDER — HYDROCHLOROTHIAZIDE 12.5 MG/1
CAPSULE, GELATIN COATED ORAL
Qty: 90 CAPSULE | Refills: 0 | OUTPATIENT
Start: 2021-01-11

## 2021-01-11 RX ORDER — LISINOPRIL 20 MG/1
20 TABLET ORAL DAILY
Qty: 90 TABLET | Refills: 1 | Status: SHIPPED | OUTPATIENT
Start: 2021-01-11 | End: 2021-01-25 | Stop reason: DRUGHIGH

## 2021-01-11 ASSESSMENT — ENCOUNTER SYMPTOMS
ABDOMINAL PAIN: 0
COUGH: 0
WHEEZING: 0
NAUSEA: 0
DIARRHEA: 0
SORE THROAT: 0
RHINORRHEA: 0
CONSTIPATION: 0
SHORTNESS OF BREATH: 0
VOMITING: 0

## 2021-01-11 ASSESSMENT — PATIENT HEALTH QUESTIONNAIRE - PHQ9
SUM OF ALL RESPONSES TO PHQ9 QUESTIONS 1 & 2: 0
SUM OF ALL RESPONSES TO PHQ QUESTIONS 1-9: 0
1. LITTLE INTEREST OR PLEASURE IN DOING THINGS: 0
SUM OF ALL RESPONSES TO PHQ QUESTIONS 1-9: 0
SUM OF ALL RESPONSES TO PHQ QUESTIONS 1-9: 0

## 2021-01-11 NOTE — PATIENT INSTRUCTIONS
SURVEY:    You may be receiving a survey from [employer] regarding your visit today. Please complete the survey to enable us to provide the highest quality of care to you and your family. If you cannot score us a very good on any question, please call the office to discuss how we could have made your experience a very good one. Thank you. Patient Education        High Blood Pressure: Care Instructions  Overview     It's normal for blood pressure to go up and down throughout the day. But if it stays up, you have high blood pressure. Another name for high blood pressure is hypertension. Despite what a lot of people think, high blood pressure usually doesn't cause headaches or make you feel dizzy or lightheaded. It usually has no symptoms. But it does increase your risk of stroke, heart attack, and other problems. You and your doctor will talk about your risks of these problems based on your blood pressure. Your doctor will give you a goal for your blood pressure. Your goal will be based on your health and your age. Lifestyle changes, such as eating healthy and being active, are always important to help lower blood pressure. You might also take medicine to reach your blood pressure goal.  Follow-up care is a key part of your treatment and safety. Be sure to make and go to all appointments, and call your doctor if you are having problems. It's also a good idea to know your test results and keep a list of the medicines you take. How can you care for yourself at home? Medical treatment  · If you stop taking your medicine, your blood pressure will go back up. You may take one or more types of medicine to lower your blood pressure. Be safe with medicines. Take your medicine exactly as prescribed. Call your doctor if you think you are having a problem with your medicine. · Talk to your doctor before you start taking aspirin every day. Aspirin can help certain people lower their risk of a heart attack or stroke. But taking aspirin isn't right for everyone, because it can cause serious bleeding. · See your doctor regularly. You may need to see the doctor more often at first or until your blood pressure comes down. · If you are taking blood pressure medicine, talk to your doctor before you take decongestants or anti-inflammatory medicine, such as ibuprofen. Some of these medicines can raise blood pressure. · Learn how to check your blood pressure at home. Lifestyle changes  · Stay at a healthy weight. This is especially important if you put on weight around the waist. Losing even 10 pounds can help you lower your blood pressure. · If your doctor recommends it, get more exercise. Walking is a good choice. Bit by bit, increase the amount you walk every day. Try for at least 30 minutes on most days of the week. You also may want to swim, bike, or do other activities. · Avoid or limit alcohol. Talk to your doctor about whether you can drink any alcohol. · Try to limit how much sodium you eat to less than 2,300 milligrams (mg) a day. Your doctor may ask you to try to eat less than 1,500 mg a day. · Eat plenty of fruits (such as bananas and oranges), vegetables, legumes, whole grains, and low-fat dairy products. · Lower the amount of saturated fat in your diet. Saturated fat is found in animal products such as milk, cheese, and meat. Limiting these foods may help you lose weight and also lower your risk for heart disease. · Do not smoke. Smoking increases your risk for heart attack and stroke. If you need help quitting, talk to your doctor about stop-smoking programs and medicines. These can increase your chances of quitting for good. When should you call for help? Go to [email or web address] and sign in to your TrashOuthart account. Enter P504 in the [employer] box to learn more about \"High Blood Pressure: Care Instructions. \"     If you do not have an account, please click on the \"Sign Up Now\" link. Current as of: December 16, 2019               Content Version: 12.6  © 5788-5572 [employer]Ywindow, "[employer]". Care instructions adapted under license by [facility] ([facility]). If you have questions about a medical condition or this instruction, always ask your healthcare professional. [address] any warranty or liability for your use of this information.

## 2021-01-11 NOTE — TELEPHONE ENCOUNTER
Channing colvin[afiaase advise, looks like pt.  Was not taking and it was DC'd??  Health Maintenance   Topic Date Due    Colon Cancer Screen FIT/FOBT  10/29/2020    Flu vaccine (1) 09/30/2021 (Originally 9/1/2020)    Annual Wellness Visit (AWV)  10/22/2021 (Originally 10/22/2019)    DEXA (modify frequency per FRAX score)  01/11/2022 (Originally 2/7/2007)    DTaP/Tdap/Td vaccine (1 - Tdap) 01/11/2022 (Originally 2/7/1971)    Shingles Vaccine (2 of 3) 01/11/2022 (Originally 4/4/2002)    Hepatitis C screen  01/11/2022 (Originally 1950)    TSH testing  07/16/2021    Potassium monitoring  07/16/2021    Creatinine monitoring  07/16/2021    Breast cancer screen  12/05/2021    Lipid screen  07/16/2025    Pneumococcal 65+ years Vaccine  Completed    Hepatitis A vaccine  Aged Out    Hepatitis B vaccine  Aged Out    Hib vaccine  Aged Out    Meningococcal (ACWY) vaccine  Aged Out             (applicable per patient's age: Cancer Screenings, Depression Screening, Fall Risk Screening, Immunizations)    LDL Cholesterol (mg/dL)   Date Value   10/29/2019 116     LDL Calculated (mg/dL)   Date Value   07/16/2020 150 (H)     AST (U/L)   Date Value   07/16/2020 19     ALT (U/L)   Date Value   07/16/2020 15     BUN (mg/dL)   Date Value   07/16/2020 11      (goal A1C is < 7)   (goal LDL is <100) need 30-50% reduction from baseline     BP Readings from Last 3 Encounters:   01/11/21 122/78   09/30/20 134/88   09/17/20 134/75    (goal /80)      All Future Testing planned in [facility]:  Lab Frequency Next Occurrence   CBC Auto Differential Once 07/10/2021   ALT Once 07/11/2021   AST Once 99/48/5487   Basic Metabolic Panel Once 50/31/3918   Lipid Panel Once 07/10/2021   T4, Free Once 07/10/2021   TSH without Reflex Once 07/10/2021       Next Visit Date:  Future Appointments   Date Time Provider Kassandra Louis   1/26/2021  8:00 AM De Eis, APRN - CNP [facility]   7/13/2021  8:30 AM [facility]            Patient Active Problem List:     Multinodular goiter     Hypertriglyceridemia     Chondromalacia of knee     Elevated blood pressure reading     Essential hypertension     Dyslipidemia     Vitamin D deficiency     Anxiety     Positive FIT (fecal immunochemical test)     Congenital hypothyroidism without goiter

## 2021-01-11 NOTE — PROGRESS NOTES
Name: Krystal Pabon  : 1950         Chief Complaint:     Chief Complaint   Patient presents with    Hypertension     routine check       History of Present Illness:      Krystal Pabon is a 78 y.o.  female who presents with Hypertension (routine check)      Julia Peck is here today for a routine office visit. Hypertensioncontrolled, however patient would like to stop taking amlodipine. She states her friend takes lisinopril and \"it works great\". Patient does have some mild dependent edema which is believed to be caused by the amlodipine. See below for further comment. Hypothyroidismstable, patient does follow with endocrinology. She will be due for labs this summer. She denies any excessive daytime sleepiness or fatigue. Hypertension  This is a chronic problem. The current episode started more than 1 year ago. The problem is unchanged. The problem is controlled. Pertinent negatives include no anxiety, chest pain, headaches, neck pain, palpitations or shortness of breath. There are no associated agents to hypertension. Risk factors for coronary artery disease include stress, post-menopausal state and family history. Past treatments include calcium channel blockers. The current treatment provides moderate improvement. There are no compliance problems. There is no history of kidney disease, CAD/MI, CVA or heart failure. There is no history of chronic renal disease.          Past Medical History:     Past Medical History:   Diagnosis Date    GERD (gastroesophageal reflux disease)     Hypertension       Reviewed all health maintenance requirements and ordered appropriate tests  Health Maintenance Due   Topic Date Due    Colon Cancer Screen FIT/FOBT  10/29/2020       Past Surgical History:     Past Surgical History:   Procedure Laterality Date    BREAST BIOPSY      TONSILLECTOMY      TUBAL LIGATION          Medications:       Prior to Admission medications Medication Sig Start Date End Date Taking? Authorizing Provider   lisinopril (PRINIVIL;ZESTRIL) 20 MG tablet Take 1 tablet by mouth daily 1/11/21  Yes LatrellCHRISTIANO Ching CNP   levothyroxine (SYNTHROID) 75 MCG tablet TAKE 1 TABLET BY MOUTH ONCE DAILY ON AN EMPTY STOMACH 30 MINUTES BEFORE BREAKFAST WITH WATER ONLY 11/22/19  Yes Historical Provider, MD   Vitamin D (CHOLECALCIFEROL) 25 MCG (1000 UT) TABS tablet Take 1 tablet by mouth daily 10/30/19  Yes CHRISTIANO Santos CNP   timolol (TIMOPTIC) 0.5 % ophthalmic solution INSTILL 1 DROP INTO RIGHT EYE TWICE DAILY 12/6/19   Historical Provider, MD        Allergies:       Patient has no known allergies. Social History:     Tobacco:    reports that she has never smoked. She has never used smokeless tobacco.  Alcohol:      reports no history of alcohol use. Drug Use:  reports no history of drug use. Family History:     Family History   Problem Relation Age of Onset    High Blood Pressure Father     High Blood Pressure Sister     High Blood Pressure Other        Review of Systems:     Positive and Negative as described in HPI    Review of Systems   Constitutional: Negative for chills, fatigue and fever. HENT: Negative for congestion, rhinorrhea and sore throat. Eyes: Negative for visual disturbance. Respiratory: Negative for cough, shortness of breath and wheezing. Cardiovascular: Positive for leg swelling. Negative for chest pain and palpitations. Gastrointestinal: Negative for abdominal pain, constipation, diarrhea, nausea and vomiting. Genitourinary: Negative for difficulty urinating and dysuria. Musculoskeletal: Negative for gait problem, myalgias, neck pain and neck stiffness. Skin: Negative for rash. Neurological: Negative for dizziness, syncope, light-headedness and headaches.        Physical Exam: Vitals:  /78 (Site: Right Upper Arm)   Pulse 88   Temp 98.2 °F (36.8 °C) (Temporal)   Resp 22   Ht 5' (1.524 m)   Wt 137 lb 3.2 oz (62.2 kg)   SpO2 99%   BMI 26.80 kg/m²     Physical Exam  Vitals signs and nursing note reviewed. Constitutional:       General: She is not in acute distress. Appearance: Normal appearance. She is not ill-appearing. HENT:      Mouth/Throat:      Mouth: Mucous membranes are moist.   Eyes:      General: No scleral icterus. Conjunctiva/sclera: Conjunctivae normal.   Neck:      Musculoskeletal: Normal range of motion and neck supple. Cardiovascular:      Rate and Rhythm: Normal rate and regular rhythm. Heart sounds: No murmur. Pulmonary:      Effort: Pulmonary effort is normal.      Breath sounds: Normal breath sounds. No wheezing. Abdominal:      General: Bowel sounds are normal. There is no distension. Palpations: Abdomen is soft. Tenderness: There is no abdominal tenderness. Musculoskeletal:      Right lower leg: Edema (trace pitting) present. Left lower leg: Edema (trace pitting) present. Lymphadenopathy:      Cervical: No cervical adenopathy. Skin:     General: Skin is warm and dry. Findings: No erythema. Neurological:      Mental Status: She is alert and oriented to person, place, and time.    Psychiatric:         Mood and Affect: Mood normal.         Behavior: Behavior normal.         Data:     Lab Results   Component Value Date     07/16/2020    K 3.9 07/16/2020     07/16/2020    CO2 26 07/16/2020    BUN 11 07/16/2020    CREATININE 0.51 07/16/2020    GLUCOSE 95 07/16/2020    PROT 8.1 10/29/2019    LABALBU 4.5 10/29/2019    BILITOT 0.47 10/29/2019    ALKPHOS 72 10/29/2019    AST 19 07/16/2020    ALT 15 07/16/2020     Lab Results   Component Value Date    WBC 7.8 10/29/2019    RBC 4.30 10/29/2019    HGB 13.7 10/29/2019    HCT 42.0 10/29/2019    MCV 97.7 10/29/2019    MCH 31.9 10/29/2019    MCHC 32.6 10/29/2019

## 2021-01-11 NOTE — TELEPHONE ENCOUNTER
Just stated at today's visit that she did not want to take the hydrochlorothiazide. She was going to try lisinopril 20 mg once daily. Please clarify with her.

## 2021-01-11 NOTE — PROGRESS NOTES
Name: Odette Palmer  : 1950         Chief Complaint:     Chief Complaint   Patient presents with    Hypertension     routine check       History of Present Illness:      Odette Palmer is a 78 y.o.  female who presents with Hypertension (routine check)      HPI      Past Medical History:     Past Medical History:   Diagnosis Date    GERD (gastroesophageal reflux disease)     Hypertension       Reviewed all health maintenance requirements and ordered appropriate tests  Health Maintenance Due   Topic Date Due    Colon Cancer Screen FIT/FOBT  10/29/2020       Past Surgical History:     Past Surgical History:   Procedure Laterality Date    BREAST BIOPSY      TONSILLECTOMY      TUBAL LIGATION          Medications:       Prior to Admission medications    Medication Sig Start Date End Date Taking? Authorizing Provider   lisinopril (PRINIVIL;ZESTRIL) 20 MG tablet Take 1 tablet by mouth daily 21  Yes CHRISTIANO Vieira CNP   levothyroxine (SYNTHROID) 75 MCG tablet TAKE 1 TABLET BY MOUTH ONCE DAILY ON AN EMPTY STOMACH 30 MINUTES BEFORE BREAKFAST WITH WATER ONLY 19  Yes Historical Provider, MD   Vitamin D (CHOLECALCIFEROL) 25 MCG (1000 UT) TABS tablet Take 1 tablet by mouth daily 10/30/19  Yes CHRISTIANO Santos CNP   timolol (TIMOPTIC) 0.5 % ophthalmic solution INSTILL 1 DROP INTO RIGHT EYE TWICE DAILY 19   Historical Provider, MD        Allergies:       Patient has no known allergies. Social History:     Tobacco:    reports that she has never smoked. She has never used smokeless tobacco.  Alcohol:      reports no history of alcohol use. Drug Use:  reports no history of drug use.     Family History:     Family History   Problem Relation Age of Onset    High Blood Pressure Father     High Blood Pressure Sister     High Blood Pressure Other        Review of Systems:     Positive and Negative as described in HPI    Review of Systems    Physical Exam: Vitals:  /78 (Site: Right Upper Arm)   Pulse 88   Temp 98.2 °F (36.8 °C) (Temporal)   Resp 22   Ht 5' (1.524 m)   Wt 137 lb 3.2 oz (62.2 kg)   SpO2 99%   BMI 26.80 kg/m²     Physical Exam    Data:     Lab Results   Component Value Date     07/16/2020    K 3.9 07/16/2020     07/16/2020    CO2 26 07/16/2020    BUN 11 07/16/2020    CREATININE 0.51 07/16/2020    GLUCOSE 95 07/16/2020    PROT 8.1 10/29/2019    LABALBU 4.5 10/29/2019    BILITOT 0.47 10/29/2019    ALKPHOS 72 10/29/2019    AST 19 07/16/2020    ALT 15 07/16/2020     Lab Results   Component Value Date    WBC 7.8 10/29/2019    RBC 4.30 10/29/2019    HGB 13.7 10/29/2019    HCT 42.0 10/29/2019    MCV 97.7 10/29/2019    MCH 31.9 10/29/2019    MCHC 32.6 10/29/2019    RDW 12.0 10/29/2019     10/29/2019    MPV 10.0 10/29/2019     Lab Results   Component Value Date    TSH 0.08 07/16/2020     Lab Results   Component Value Date    CHOL 239 07/16/2020    HDL 61 07/16/2020       Assessment/Plan:      Diagnosis Orders   1. Essential hypertension  lisinopril (PRINIVIL;ZESTRIL) 20 MG tablet    CBC Auto Differential    ALT    AST    Basic Metabolic Panel    Lipid Panel   2. Congenital hypothyroidism without goiter  T4, Free    TSH without Reflex       1. Isabelle Jeffery received counseling on the following healthy behaviors: nutrition, exercise and medication adherence  2. Patient given educational materials - see patient instructions  3. Was a self-tracking handout given in paper form or via Im[employer]? No  If yes, see orders or list here. 4.  Discussed use, benefit, and side effects of prescribed medications. Barriers to medication compliance addressed. All patient questions answered. Pt voiced understanding. 5.  Reviewed prior labs and health maintenance  6. Continue current medications, diet and exercise.     Completed Refills   Requested Prescriptions     Signed Prescriptions Disp Refills  lisinopril (PRINIVIL;ZESTRIL) 20 MG tablet 90 tablet 1     Sig: Take 1 tablet by mouth daily         Return in about 6 months (around 7/11/2021) for Check up- Dante.

## 2021-01-25 ENCOUNTER — NURSE ONLY (OUTPATIENT)
Dept: PRIMARY CARE CLINIC | Age: 71
End: 2021-01-25

## 2021-01-25 VITALS
SYSTOLIC BLOOD PRESSURE: 148 MMHG | TEMPERATURE: 97.8 F | RESPIRATION RATE: 20 BRPM | HEART RATE: 98 BPM | DIASTOLIC BLOOD PRESSURE: 100 MMHG

## 2021-01-25 DIAGNOSIS — I10 ESSENTIAL HYPERTENSION: ICD-10-CM

## 2021-01-25 RX ORDER — LISINOPRIL 20 MG/1
40 TABLET ORAL DAILY
Qty: 90 TABLET | Refills: 1 | Status: SHIPPED
Start: 2021-01-25 | End: 2021-03-04

## 2021-01-25 ASSESSMENT — PATIENT HEALTH QUESTIONNAIRE - PHQ9
2. FEELING DOWN, DEPRESSED OR HOPELESS: 0
SUM OF ALL RESPONSES TO PHQ QUESTIONS 1-9: 0
SUM OF ALL RESPONSES TO PHQ QUESTIONS 1-9: 0
1. LITTLE INTEREST OR PLEASURE IN DOING THINGS: 0

## 2021-01-25 NOTE — PATIENT INSTRUCTIONS
SURVEY:    You may be receiving a survey from [facility] regarding your visit today. Please complete the survey to enable us to provide the highest quality of care to you and your family. If you cannot score us a very good on any question, please call the office to discuss how we could have made your experience a very good one. Thank you. Patient Education        High Blood Pressure: Care Instructions  Overview     It's normal for blood pressure to go up and down throughout the day. But if it stays up, you have high blood pressure. Another name for high blood pressure is hypertension. Despite what a lot of people think, high blood pressure usually doesn't cause headaches or make you feel dizzy or lightheaded. It usually has no symptoms. But it does increase your risk of stroke, heart attack, and other problems. You and your doctor will talk about your risks of these problems based on your blood pressure. Your doctor will give you a goal for your blood pressure. Your goal will be based on your health and your age. Lifestyle changes, such as eating healthy and being active, are always important to help lower blood pressure. You might also take medicine to reach your blood pressure goal.  Follow-up care is a key part of your treatment and safety. Be sure to make and go to all appointments, and call your doctor if you are having problems. It's also a good idea to know your test results and keep a list of the medicines you take. How can you care for yourself at home? Medical treatment  · If you stop taking your medicine, your blood pressure will go back up. You may take one or more types of medicine to lower your blood pressure. Be safe with medicines. Take your medicine exactly as prescribed. Call your doctor if you think you are having a problem with your medicine. · Talk to your doctor before you start taking aspirin every day. Aspirin can help certain people lower their risk of a heart attack or stroke. But taking aspirin isn't right for everyone, because it can cause serious bleeding. · See your doctor regularly. You may need to see the doctor more often at first or until your blood pressure comes down. · If you are taking blood pressure medicine, talk to your doctor before you take decongestants or anti-inflammatory medicine, such as ibuprofen. Some of these medicines can raise blood pressure. · Learn how to check your blood pressure at home. Lifestyle changes  · Stay at a healthy weight. This is especially important if you put on weight around the waist. Losing even 10 pounds can help you lower your blood pressure. · If your doctor recommends it, get more exercise. Walking is a good choice. Bit by bit, increase the amount you walk every day. Try for at least 30 minutes on most days of the week. You also may want to swim, bike, or do other activities. · Avoid or limit alcohol. Talk to your doctor about whether you can drink any alcohol. · Try to limit how much sodium you eat to less than 2,300 milligrams (mg) a day. Your doctor may ask you to try to eat less than 1,500 mg a day. · Eat plenty of fruits (such as bananas and oranges), vegetables, legumes, whole grains, and low-fat dairy products. · Lower the amount of saturated fat in your diet. Saturated fat is found in animal products such as milk, cheese, and meat. Limiting these foods may help you lose weight and also lower your risk for heart disease. · Do not smoke. Smoking increases your risk for heart attack and stroke. If you need help quitting, talk to your doctor about stop-smoking programs and medicines. These can increase your chances of quitting for good. When should you call for help? Call  911 anytime you think you may need emergency care. This may mean having symptoms that suggest that your blood pressure is causing a serious heart or blood vessel problem. Your blood pressure may be over 180/120. For example, call 911 if:    · You have symptoms of a heart attack. These may include:  ? Chest pain or pressure, or a strange feeling in the chest.  ? Sweating. ? Shortness of breath. ? Nausea or vomiting. ? Pain, pressure, or a strange feeling in the back, neck, jaw, or upper belly or in one or both shoulders or arms. ? Lightheadedness or sudden weakness. ? A fast or irregular heartbeat.     · You have symptoms of a stroke. These may include:  ? Sudden numbness, tingling, weakness, or loss of movement in your face, arm, or leg, especially on only one side of your body. ? Sudden vision changes. ? Sudden trouble speaking. ? Sudden confusion or trouble understanding simple statements. ? Sudden problems with walking or balance. ? A sudden, severe headache that is different from past headaches.     · You have severe back or belly pain. Do not wait until your blood pressure comes down on its own. Get help right away. Call your doctor now or seek immediate care if:    · Your blood pressure is much higher than normal (such as 180/120 or higher), but you don't have symptoms.     · You think high blood pressure is causing symptoms, such as:  ? Severe headache.  ? Blurry vision. Watch closely for changes in your health, and be sure to contact your doctor if:    · Your blood pressure measures higher than your doctor recommends at least 2 times. That means the top number is higher or the bottom number is higher, or both.     · You think you may be having side effects from your blood pressure medicine. Where can you learn more? Go to https://[email or web address].[email or web address] and sign in to your LesConciergeshart account. Enter X186 in the FAGUOhire box to learn more about \"High Blood Pressure: Care Instructions. \"     If you do not have an account, please click on the \"Sign Up Now\" link. Current as of: December 16, 2019               Content Version: 12.6  © 4345-3380 [employer]. Care instructions adapted under license by [facility] ([facility]). If you have questions about a medical condition or this instruction, always ask your healthcare professional. [place] [address] any warranty or liability for your use of this information.

## 2021-01-26 ENCOUNTER — NURSE ONLY (OUTPATIENT)
Dept: PRIMARY CARE CLINIC | Age: 71
End: 2021-01-26

## 2021-01-26 VITALS
WEIGHT: 139 LBS | SYSTOLIC BLOOD PRESSURE: 142 MMHG | BODY MASS INDEX: 27.29 KG/M2 | HEIGHT: 60 IN | OXYGEN SATURATION: 97 % | DIASTOLIC BLOOD PRESSURE: 92 MMHG | RESPIRATION RATE: 18 BRPM | HEART RATE: 97 BPM

## 2021-01-26 DIAGNOSIS — I10 HYPERTENSION, UNSPECIFIED TYPE: Primary | ICD-10-CM

## 2021-02-05 ENCOUNTER — NURSE ONLY (OUTPATIENT)
Dept: PRIMARY CARE CLINIC | Age: 71
End: 2021-02-05

## 2021-02-05 VITALS
RESPIRATION RATE: 18 BRPM | HEART RATE: 125 BPM | OXYGEN SATURATION: 98 % | SYSTOLIC BLOOD PRESSURE: 132 MMHG | TEMPERATURE: 97.9 F | HEIGHT: 61 IN | DIASTOLIC BLOOD PRESSURE: 80 MMHG | BODY MASS INDEX: 26.26 KG/M2

## 2021-02-05 DIAGNOSIS — I10 HYPERTENSION, UNSPECIFIED TYPE: Primary | ICD-10-CM

## 2021-02-05 NOTE — PATIENT INSTRUCTIONS
SURVEY:    You may be receiving a survey from [employer] regarding your visit today. Please complete the survey to enable us to provide the highest quality of care to you and your family. If you cannot score us a very good on any question, please call the office to discuss how we could have made your experience a very good one. Thank you.

## 2021-03-04 ENCOUNTER — TELEPHONE (OUTPATIENT)
Dept: PRIMARY CARE CLINIC | Age: 71
End: 2021-03-04

## 2021-03-04 ENCOUNTER — NURSE ONLY (OUTPATIENT)
Dept: PRIMARY CARE CLINIC | Age: 71
End: 2021-03-04

## 2021-03-04 VITALS
HEIGHT: 61 IN | OXYGEN SATURATION: 98 % | HEART RATE: 99 BPM | BODY MASS INDEX: 26.24 KG/M2 | SYSTOLIC BLOOD PRESSURE: 161 MMHG | DIASTOLIC BLOOD PRESSURE: 97 MMHG | WEIGHT: 139 LBS | RESPIRATION RATE: 18 BRPM | TEMPERATURE: 98.3 F

## 2021-03-04 DIAGNOSIS — I10 HYPERTENSION, UNSPECIFIED TYPE: Primary | ICD-10-CM

## 2021-03-04 RX ORDER — AMLODIPINE BESYLATE 2.5 MG/1
2.5 TABLET ORAL DAILY
Qty: 30 TABLET | Refills: 5 | Status: SHIPPED | OUTPATIENT
Start: 2021-03-04 | End: 2021-04-15 | Stop reason: SDUPTHER

## 2021-03-04 RX ORDER — LISINOPRIL 40 MG/1
40 TABLET ORAL DAILY
Qty: 30 TABLET | Refills: 3 | Status: SHIPPED | OUTPATIENT
Start: 2021-03-04 | End: 2021-04-15

## 2021-03-04 NOTE — TELEPHONE ENCOUNTER
Can you update [facility] and let her know she is taking 2 20 mg tablets. Please contact her and let her know if pressures continue to be elevated to schedule and appointment.

## 2021-03-04 NOTE — TELEPHONE ENCOUNTER
[facility] called and asked if Ms. Ramon  Should take lisinopril 20 mg or 40 mg? The last note says 20 mg but on 1/25/21 it looks like it was changed. please advise.      Thank you    Nikolai Ortega

## 2021-03-04 NOTE — TELEPHONE ENCOUNTER
I believe she should be taking 40 mg. Can you please call her and clarify what dosage she has been taking.   Thank you

## 2021-03-12 ENCOUNTER — OFFICE VISIT (OUTPATIENT)
Dept: PRIMARY CARE CLINIC | Age: 71
End: 2021-03-12
Payer: MEDICARE

## 2021-03-12 VITALS
BODY MASS INDEX: 26.86 KG/M2 | WEIGHT: 136.8 LBS | RESPIRATION RATE: 18 BRPM | TEMPERATURE: 98.6 F | OXYGEN SATURATION: 97 % | HEART RATE: 112 BPM | HEIGHT: 60 IN | SYSTOLIC BLOOD PRESSURE: 136 MMHG | DIASTOLIC BLOOD PRESSURE: 86 MMHG

## 2021-03-12 DIAGNOSIS — F41.1 GAD (GENERALIZED ANXIETY DISORDER): Primary | ICD-10-CM

## 2021-03-12 DIAGNOSIS — M17.11 ARTHRITIS OF RIGHT KNEE: ICD-10-CM

## 2021-03-12 DIAGNOSIS — I10 ESSENTIAL HYPERTENSION: ICD-10-CM

## 2021-03-12 PROCEDURE — 99214 OFFICE O/P EST MOD 30 MIN: CPT | Performed by: NURSE PRACTITIONER

## 2021-03-12 RX ORDER — ACETAMINOPHEN 500 MG
500 TABLET ORAL EVERY 6 HOURS PRN
COMMUNITY

## 2021-03-12 RX ORDER — BUSPIRONE HYDROCHLORIDE 5 MG/1
5 TABLET ORAL 2 TIMES DAILY
Qty: 60 TABLET | Refills: 0 | Status: SHIPPED | OUTPATIENT
Start: 2021-03-12 | End: 2021-04-08

## 2021-03-12 ASSESSMENT — ENCOUNTER SYMPTOMS
BLURRED VISION: 0
ORTHOPNEA: 0
VOMITING: 0
EYE REDNESS: 0
PHOTOPHOBIA: 0
SHORTNESS OF BREATH: 0
NAUSEA: 0
WHEEZING: 0
EYE PAIN: 0
EYE DISCHARGE: 0
EYE ITCHING: 0
COUGH: 0

## 2021-03-12 NOTE — PROGRESS NOTES
Name: Rip Siu  : 1950         Chief Complaint:     Chief Complaint   Patient presents with    Blood Pressure Check    Other     x several years. \"Eyelids feel puffy\" No vision problems. History of Present Illness:      Rip Siu is a 73 y.o.  female who presents with Blood Pressure Check and Other (x several years. \"Eyelids feel puffy\" No vision problems. )      Alan Mishra is here today for a routine office visit. Pain right knee- She is following with ortho. She is going to see Dr. Asiya Sparks on the . She had an MRI at [facility] and it showed arthritis and a baker cyst. She has been acetaminophen and that seems to help. Anxiety- She reports she has \"a lot going on with my knee and blood pressure\" has been feeling more anxious. She has a know history of anxiety and had been on buspar before and would like to try this again. No thoughts of hurting herself or anyone else. Puffy eyes- Has been going on for years intermittently. She is going to see the eye doctor for this problem but canceled appointment and it is rescheduled for April. She feels like her eyelids are \"thick\" and swell sometimes. Denies any vision changes, itching, pain or discharge. Hypertension  This is a chronic problem. The current episode started more than 1 year ago. The problem is controlled. Associated symptoms include anxiety. Pertinent negatives include no blurred vision, chest pain, headaches, orthopnea, palpitations, peripheral edema or shortness of breath. Agents associated with hypertension include thyroid hormones. Risk factors for coronary artery disease include dyslipidemia. Past treatments include ACE inhibitors and calcium channel blockers. The current treatment provides moderate improvement. There is no history of angina or kidney disease.          Past Medical History:     Past Medical History:   Diagnosis Date    GERD (gastroesophageal reflux disease)     Hypertension       Reviewed all health maintenance requirements and ordered appropriate tests  Health Maintenance Due   Topic Date Due    Colon Cancer Screen FIT/FOBT  10/29/2020       Past Surgical History:     Past Surgical History:   Procedure Laterality Date    BREAST BIOPSY      TONSILLECTOMY      TUBAL LIGATION          Medications:       Prior to Admission medications    Medication Sig Start Date End Date Taking? Authorizing Provider   busPIRone (BUSPAR) 5 MG tablet Take 1 tablet by mouth 2 times daily 3/12/21 4/11/21 Yes CHRISTIANO Santos CNP   acetaminophen (TYLENOL) 500 MG tablet Take 500 mg by mouth every 6 hours as needed   Yes Historical Provider, MD   lisinopril (PRINIVIL;ZESTRIL) 40 MG tablet Take 1 tablet by mouth daily 3/4/21  Yes CHRISTIANO Santos CNP   amLODIPine (NORVASC) 2.5 MG tablet Take 1 tablet by mouth daily 3/4/21  Yes CHRISTIANO Santos CNP   levothyroxine (SYNTHROID) 75 MCG tablet TAKE 1 TABLET BY MOUTH ONCE DAILY ON AN EMPTY STOMACH 30 MINUTES BEFORE BREAKFAST WITH WATER ONLY 11/22/19  Yes Historical Provider, MD   Vitamin D (CHOLECALCIFEROL) 25 MCG (1000 UT) TABS tablet Take 1 tablet by mouth daily  Patient taking differently: Take 1,000 Units by mouth daily as needed  10/30/19  Yes CHRISTIANO Santos CNP   timolol (TIMOPTIC) 0.5 % ophthalmic solution INSTILL 1 DROP INTO RIGHT EYE TWICE DAILY 12/6/19   Historical Provider, MD        Allergies:       Patient has no known allergies. Social History:     Tobacco:    reports that she has never smoked. She has never used smokeless tobacco.  Alcohol:      reports no history of alcohol use. Drug Use:  reports no history of drug use. Family History:     Family History   Problem Relation Age of Onset    High Blood Pressure Father     High Blood Pressure Sister     High Blood Pressure Other        Review of Systems:     Positive and Negative as described in HPI    Review of Systems   Constitutional: Negative for activity change, fatigue and fever.    Eyes: General: Tenderness present. Right knee: She exhibits normal range of motion, no ecchymosis and no erythema. Tenderness found. Right lower leg: No edema. Left lower leg: No edema. Lymphadenopathy:      Cervical: No cervical adenopathy. Skin:     Findings: No erythema or rash. Neurological:      General: No focal deficit present. Mental Status: She is alert and oriented to person, place, and time. Psychiatric:         Mood and Affect: Mood is anxious (  At baseline). Mood is not depressed. Affect is not angry. Behavior: Behavior is not agitated or aggressive. Data:     Lab Results   Component Value Date     07/16/2020    K 3.9 07/16/2020     07/16/2020    CO2 26 07/16/2020    BUN 11 07/16/2020    CREATININE 0.51 07/16/2020    GLUCOSE 95 07/16/2020    PROT 8.1 10/29/2019    LABALBU 4.5 10/29/2019    BILITOT 0.47 10/29/2019    ALKPHOS 72 10/29/2019    AST 19 07/16/2020    ALT 15 07/16/2020     Lab Results   Component Value Date    WBC 7.8 10/29/2019    RBC 4.30 10/29/2019    HGB 13.7 10/29/2019    HCT 42.0 10/29/2019    MCV 97.7 10/29/2019    MCH 31.9 10/29/2019    MCHC 32.6 10/29/2019    RDW 12.0 10/29/2019     10/29/2019    MPV 10.0 10/29/2019     Lab Results   Component Value Date    TSH 0.08 07/16/2020     Lab Results   Component Value Date    CHOL 239 07/16/2020    HDL 61 07/16/2020       Assessment/Plan:      Diagnosis Orders   1. EFREN (generalized anxiety disorder)  busPIRone (BUSPAR) 5 MG tablet   2. Essential hypertension     3. Arthritis of right knee       Generalized anxiety disorderstart BuSpar 5 mg twice daily. Essential hypertensionimproving. Continue lisinopril 40 mg daily. Continue amlodipine 2.5 mg daily. She has not had any swelling in her lower extremities since starting amlodipine. Arthritis right kneeshe will continue to follow with Ortho. She can continue Tylenol as needed.     I do not see any abnormality of her eyes or eyelids. She is going to follow-up with her eye doctor. 1.  Markus Wheeler received counseling on the following healthy behaviors: nutrition, exercise and medication adherence  2. Patient given educational materials - see patient instructions  3. Was a self-tracking handout given in paper form or via [employer]? No  If yes, see orders or list here. 4.  Discussed use, benefit, and side effects of prescribed medications. Barriers to medication compliance addressed. All patient questions answered. Pt voiced understanding. 5.  Reviewed prior labs and health maintenance  6. Continue current medications, diet and exercise. Completed Refills   Requested Prescriptions     Signed Prescriptions Disp Refills    busPIRone (BUSPAR) 5 MG tablet 60 tablet 0     Sig: Take 1 tablet by mouth 2 times daily         Return in about 4 weeks (around 4/9/2021) for check up.

## 2021-03-12 NOTE — PATIENT INSTRUCTIONS
SURVEY:    You may be receiving a survey from [employer] regarding your visit today. Please complete the survey to enable us to provide the highest quality of care to you and your family. If you cannot score us a very good on any question, please call the office to discuss how we could have made your experience a very good one. Thank you.

## 2021-04-08 DIAGNOSIS — F41.1 GAD (GENERALIZED ANXIETY DISORDER): ICD-10-CM

## 2021-04-08 RX ORDER — BUSPIRONE HYDROCHLORIDE 5 MG/1
TABLET ORAL
Qty: 60 TABLET | Refills: 0 | Status: SHIPPED | OUTPATIENT
Start: 2021-04-08 | End: 2021-04-15 | Stop reason: SDUPTHER

## 2021-04-08 NOTE — TELEPHONE ENCOUNTER
Health Maintenance   Topic Date Due    Colon Cancer Screen FIT/FOBT  10/29/2020    Flu vaccine (Season Ended) 09/30/2021 (Originally 9/1/2021)    Annual Wellness Visit (AWV)  10/22/2021 (Originally 10/22/2019)    DEXA (modify frequency per FRAX score)  01/11/2022 (Originally 2/7/2007)    DTaP/Tdap/Td vaccine (1 - Tdap) 01/11/2022 (Originally 2/7/1971)    Shingles Vaccine (2 of 3) 01/11/2022 (Originally 4/4/2002)    Hepatitis C screen  01/11/2022 (Originally 1950)    TSH testing  07/16/2021    Potassium monitoring  07/16/2021    Creatinine monitoring  07/16/2021    Breast cancer screen  12/05/2021    Lipid screen  07/16/2025    Pneumococcal 65+ years Vaccine  Completed    COVID-19 Vaccine  Completed    Hepatitis A vaccine  Aged Out    Hepatitis B vaccine  Aged Out    Hib vaccine  Aged Out    Meningococcal (ACWY) vaccine  Aged Out             (applicable per patient's age: Cancer Screenings, Depression Screening, Fall Risk Screening, Immunizations)    LDL Cholesterol (mg/dL)   Date Value   10/29/2019 116     LDL Calculated (mg/dL)   Date Value   07/16/2020 150 (H)     AST (U/L)   Date Value   07/16/2020 19     ALT (U/L)   Date Value   07/16/2020 15     BUN (mg/dL)   Date Value   07/16/2020 11      (goal A1C is < 7)   (goal LDL is <100) need 30-50% reduction from baseline     BP Readings from Last 3 Encounters:   03/12/21 136/86   03/04/21 (!) 161/97   02/05/21 132/80    (goal /80)      All Future Testing planned in [facility]:  Lab Frequency Next Occurrence   CBC Auto Differential Once 07/10/2021   ALT Once 07/11/2021   AST Once 87/80/1156   Basic Metabolic Panel Once 04/26/9712   Lipid Panel Once 07/10/2021   T4, Free Once 07/10/2021   TSH without Reflex Once 07/10/2021       Next Visit Date:  Future Appointments   Date Time Provider Kassandra Louis   4/15/2021  9:00 AM CHRISTIANO Adrian - CNP [facility]   7/13/2021  8:30 AM Dante Carrillo, APRN - CNP [facility] RU[facility]AP Patient Active Problem List:     Multinodular goiter     Hypertriglyceridemia     Chondromalacia of knee     Elevated blood pressure reading     Essential hypertension     Dyslipidemia     Vitamin D deficiency     Anxiety     Positive FIT (fecal immunochemical test)     Congenital hypothyroidism without goiter

## 2021-04-15 ENCOUNTER — TELEPHONE (OUTPATIENT)
Dept: PRIMARY CARE CLINIC | Age: 71
End: 2021-04-15

## 2021-04-15 ENCOUNTER — OFFICE VISIT (OUTPATIENT)
Dept: PRIMARY CARE CLINIC | Age: 71
End: 2021-04-15
Payer: MEDICARE

## 2021-04-15 VITALS
HEIGHT: 60 IN | TEMPERATURE: 98.8 F | OXYGEN SATURATION: 98 % | RESPIRATION RATE: 18 BRPM | WEIGHT: 140.8 LBS | SYSTOLIC BLOOD PRESSURE: 132 MMHG | BODY MASS INDEX: 27.64 KG/M2 | HEART RATE: 97 BPM | DIASTOLIC BLOOD PRESSURE: 86 MMHG

## 2021-04-15 DIAGNOSIS — R19.5 POSITIVE FIT (FECAL IMMUNOCHEMICAL TEST): Primary | ICD-10-CM

## 2021-04-15 DIAGNOSIS — F41.1 GAD (GENERALIZED ANXIETY DISORDER): ICD-10-CM

## 2021-04-15 DIAGNOSIS — I10 ESSENTIAL HYPERTENSION: Primary | ICD-10-CM

## 2021-04-15 PROCEDURE — 99214 OFFICE O/P EST MOD 30 MIN: CPT | Performed by: NURSE PRACTITIONER

## 2021-04-15 RX ORDER — BUSPIRONE HYDROCHLORIDE 5 MG/1
5 TABLET ORAL 2 TIMES DAILY
Qty: 180 TABLET | Refills: 1 | Status: SHIPPED | OUTPATIENT
Start: 2021-04-15 | End: 2021-08-30 | Stop reason: ALTCHOICE

## 2021-04-15 RX ORDER — LISINOPRIL 20 MG/1
20 TABLET ORAL DAILY
Qty: 90 TABLET | Refills: 1 | Status: SHIPPED | OUTPATIENT
Start: 2021-04-15 | End: 2021-12-15 | Stop reason: ALTCHOICE

## 2021-04-15 RX ORDER — AMLODIPINE BESYLATE 2.5 MG/1
2.5 TABLET ORAL DAILY
Qty: 90 TABLET | Refills: 1 | Status: SHIPPED | OUTPATIENT
Start: 2021-04-15 | End: 2021-12-15 | Stop reason: SDUPTHER

## 2021-04-15 ASSESSMENT — ENCOUNTER SYMPTOMS
EYE ITCHING: 0
EYE DISCHARGE: 0
VOMITING: 0
EYE PAIN: 0
COUGH: 0
PHOTOPHOBIA: 0
WHEEZING: 0
ORTHOPNEA: 0
EYE REDNESS: 0
BLURRED VISION: 0
SHORTNESS OF BREATH: 0
NAUSEA: 0

## 2021-04-15 NOTE — PROGRESS NOTES
MG tablet Take 1 tablet by mouth 2 times daily 4/15/21  Yes CHRISTIANO Santos CNP   amLODIPine (NORVASC) 2.5 MG tablet Take 1 tablet by mouth daily 4/15/21  Yes CHRISTIANO Santos CNP   acetaminophen (TYLENOL) 500 MG tablet Take 500 mg by mouth every 6 hours as needed   Yes Historical Provider, MD   levothyroxine (SYNTHROID) 75 MCG tablet TAKE 1 TABLET BY MOUTH ONCE DAILY ON AN EMPTY STOMACH 30 MINUTES BEFORE BREAKFAST WITH WATER ONLY 11/22/19  Yes Historical Provider, MD   Vitamin D (CHOLECALCIFEROL) 25 MCG (1000 UT) TABS tablet Take 1 tablet by mouth daily  Patient taking differently: Take 1,000 Units by mouth daily as needed  10/30/19  Yes CHRISTIANO Santos CNP   timolol (TIMOPTIC) 0.5 % ophthalmic solution INSTILL 1 DROP INTO RIGHT EYE TWICE DAILY 12/6/19   Historical Provider, MD        Allergies:       Patient has no known allergies. Social History:     Tobacco:    reports that she has never smoked. She has never used smokeless tobacco.  Alcohol:      reports no history of alcohol use. Drug Use:  reports no history of drug use. Family History:     Family History   Problem Relation Age of Onset    High Blood Pressure Father     High Blood Pressure Sister     High Blood Pressure Other        Review of Systems:     Positive and Negative as described in HPI    Review of Systems   Constitutional: Negative for activity change, fatigue and fever. Eyes: Negative for blurred vision, photophobia, pain, discharge, redness, itching and visual disturbance. Respiratory: Negative for cough, shortness of breath and wheezing. Cardiovascular: Negative for chest pain, palpitations, orthopnea and leg swelling. Gastrointestinal: Negative for nausea and vomiting. Skin: Negative for rash and wound. Neurological: Negative for dizziness and headaches. Psychiatric/Behavioral: Positive for decreased concentration (Stable). Negative for agitation, dysphoric mood and sleep disturbance.  The patient is nervous/anxious (Improving). Physical Exam:   Vitals:  /86 (Site: Left Upper Arm, Position: Sitting, Cuff Size: Large Adult)   Pulse 97   Temp 98.8 °F (37.1 °C) (Temporal)   Resp 18   Ht 5' (1.524 m)   Wt 140 lb 12.8 oz (63.9 kg)   SpO2 98%   BMI 27.50 kg/m²     Physical Exam  Vitals signs and nursing note reviewed. Constitutional:       General: She is not in acute distress. Appearance: Normal appearance. She is not toxic-appearing or diaphoretic. HENT:      Head: Normocephalic and atraumatic. Neck:      Musculoskeletal: Normal range of motion and neck supple. Cardiovascular:      Rate and Rhythm: Normal rate and regular rhythm. Heart sounds: Normal heart sounds, S1 normal and S2 normal. No murmur. Pulmonary:      Effort: Pulmonary effort is normal.      Breath sounds: Normal breath sounds. No wheezing, rhonchi or rales. Musculoskeletal:      Right lower leg: No edema. Left lower leg: No edema. Lymphadenopathy:      Cervical: No cervical adenopathy. Neurological:      General: No focal deficit present. Mental Status: She is alert and oriented to person, place, and time. Psychiatric:         Mood and Affect: Mood is anxious (  At baseline). Mood is not depressed. Affect is not angry. Behavior: Behavior is not agitated or aggressive.          Data:     Lab Results   Component Value Date     07/16/2020    K 3.9 07/16/2020     07/16/2020    CO2 26 07/16/2020    BUN 11 07/16/2020    CREATININE 0.51 07/16/2020    GLUCOSE 95 07/16/2020    PROT 8.1 10/29/2019    LABALBU 4.5 10/29/2019    BILITOT 0.47 10/29/2019    ALKPHOS 72 10/29/2019    AST 19 07/16/2020    ALT 15 07/16/2020     Lab Results   Component Value Date    WBC 7.8 10/29/2019    RBC 4.30 10/29/2019    HGB 13.7 10/29/2019    HCT 42.0 10/29/2019    MCV 97.7 10/29/2019    MCH 31.9 10/29/2019    MCHC 32.6 10/29/2019    RDW 12.0 10/29/2019     10/29/2019    MPV 10.0 10/29/2019     Lab Results   Component Value Date    TSH 0.08 07/16/2020     Lab Results   Component Value Date    CHOL 239 07/16/2020    HDL 61 07/16/2020       Assessment/Plan:      Diagnosis Orders   1. Essential hypertension  Comprehensive Metabolic Panel    Lipid Panel    CBC Auto Differential   2. EFREN (generalized anxiety disorder)  busPIRone (BUSPAR) 5 MG tablet       Essential hypertensionstable. She reports that she has only been taking 20 mg of lisinopril 2.5 mg of the amlodipine. We will stop 40 mg of lisinopril send a new prescription for 20 mg.    Generalized anxiety disorderstable. I do think that she would benefit from an increase in BuSpar however she does not want to increase medication at this time. I discussed with her if she changes her mind to contact the office. She verbalized understanding. 1.  Licha Purcell received counseling on the following healthy behaviors: nutrition, exercise and medication adherence  2. Patient given educational materials - see patient instructions  3. Was a self-tracking handout given in paper form or via [employer]? No  If yes, see orders or list here. 4.  Discussed use, benefit, and side effects of prescribed medications. Barriers to medication compliance addressed. All patient questions answered. Pt voiced understanding. 5.  Reviewed prior labs and health maintenance  6. Continue current medications, diet and exercise. Completed Refills   Requested Prescriptions     Signed Prescriptions Disp Refills    lisinopril (PRINIVIL;ZESTRIL) 20 MG tablet 90 tablet 1     Sig: Take 1 tablet by mouth daily    busPIRone (BUSPAR) 5 MG tablet 180 tablet 1     Sig: Take 1 tablet by mouth 2 times daily    amLODIPine (NORVASC) 2.5 MG tablet 90 tablet 1     Sig: Take 1 tablet by mouth daily         No follow-ups on file.

## 2021-04-15 NOTE — TELEPHONE ENCOUNTER
Second attempt calling patient to notify her of the referral for colonoscopy. Unable to leave message.

## 2021-04-15 NOTE — PATIENT INSTRUCTIONS
SURVEY:    You may be receiving a survey from [employer] regarding your visit today. Please complete the survey to enable us to provide the highest quality of care to you and your family. If you cannot score us a very good on any question, please call the office to discuss how we could have made your experience a very good one. Thank you.

## 2021-04-15 NOTE — TELEPHONE ENCOUNTER
Patient is due for colon cancer screening. It looks like back in 2019 she had a positive fit test and was referred for colonoscopy but this was never completed. I would recommend that she have a colonoscopy completed. There is no longer a gastroenterologist in [facility] [address] a general surgeon does the colonoscopies. I will place this referral and strongly recommend that she have this completed. If she has any questions please have her let me know.   Thank you

## 2021-04-16 ENCOUNTER — TELEPHONE (OUTPATIENT)
Dept: PRIMARY CARE CLINIC | Age: 71
End: 2021-04-16

## 2021-04-16 NOTE — TELEPHONE ENCOUNTER
Contacted patient about referral for colonoscopy. Informed patient about positive FIT card back in 2019. Patient stated \"there are false positives so I got it checked with another test and it came back fine. \" Patient refuses colonoscopy.

## 2021-08-12 ENCOUNTER — TELEPHONE (OUTPATIENT)
Dept: PRIMARY CARE CLINIC | Age: 71
End: 2021-08-12

## 2021-08-23 ENCOUNTER — TELEPHONE (OUTPATIENT)
Dept: PRIMARY CARE CLINIC | Age: 71
End: 2021-08-23

## 2021-08-23 NOTE — TELEPHONE ENCOUNTER
Received phone call from [facility] stating that patient is scheduled for surgery on Sept 1 and had pre-op testing and anesthesia noticed that patient has \"density\" on chest xray and just wanted to make sure PCP knows and something documented in surgical clearance note.      Health Maintenance   Topic Date Due    Colon Cancer Screen FIT/FOBT  10/29/2020    TSH testing  07/16/2021    Potassium monitoring  07/16/2021    Creatinine monitoring  07/16/2021    Annual Wellness Visit (AWV)  10/22/2021 (Originally 10/22/2019)    DEXA (modify frequency per FRAX score)  01/11/2022 (Originally 2/7/2007)    DTaP/Tdap/Td vaccine (1 - Tdap) 01/11/2022 (Originally 2/7/1971)    Shingles Vaccine (2 of 3) 01/11/2022 (Originally 4/4/2002)    Hepatitis C screen  01/11/2022 (Originally 1950)    Flu vaccine (1) 09/01/2021    Breast cancer screen  12/05/2021    Lipid screen  07/16/2025    Pneumococcal 65+ years Vaccine  Completed    COVID-19 Vaccine  Completed    Hepatitis A vaccine  Aged Out    Hepatitis B vaccine  Aged Out    Hib vaccine  Aged Out    Meningococcal (ACWY) vaccine  Aged Out             (applicable per patient's age: Cancer Screenings, Depression Screening, Fall Risk Screening, Immunizations)    LDL Cholesterol (mg/dL)   Date Value   10/29/2019 116     LDL Calculated (mg/dL)   Date Value   07/16/2020 150 (H)     AST (U/L)   Date Value   07/16/2020 19     ALT (U/L)   Date Value   07/16/2020 15     BUN (mg/dL)   Date Value   07/16/2020 11      (goal A1C is < 7)   (goal LDL is <100) need 30-50% reduction from baseline     BP Readings from Last 3 Encounters:   04/15/21 132/86   03/12/21 136/86   03/04/21 (!) 161/97    (goal /80)      All Future Testing planned in [facility]:  Lab Frequency Next Occurrence   Comprehensive Metabolic Panel Once 69/68/8989   Lipid Panel Once 10/12/2021   CBC Auto Differential Once 10/12/2021       Next Visit Date:  Future Appointments   Date Time Provider Kassandra Louis 8/25/2021  2:30 PM [facility]   10/20/2021  9:00 AM CHRISTIANO Richmond CNP [facility]            Patient Active Problem List:     Multinodular goiter     Hypertriglyceridemia     Chondromalacia of knee     Elevated blood pressure reading     Essential hypertension     Dyslipidemia     Vitamin D deficiency     Anxiety     Positive FIT (fecal immunochemical test)     Congenital hypothyroidism without goiter

## 2021-08-24 DIAGNOSIS — R93.89 NODULAR RADIOLOGIC DENSITY: Primary | ICD-10-CM

## 2021-08-24 NOTE — TELEPHONE ENCOUNTER
1 [facility] medical records department to obtain results of labs and chest xray. They are to fax results.

## 2021-08-25 ENCOUNTER — OFFICE VISIT (OUTPATIENT)
Dept: PRIMARY CARE CLINIC | Age: 71
End: 2021-08-25
Payer: MEDICARE

## 2021-08-25 VITALS
BODY MASS INDEX: 27.05 KG/M2 | RESPIRATION RATE: 18 BRPM | OXYGEN SATURATION: 97 % | WEIGHT: 137.8 LBS | HEIGHT: 60 IN | HEART RATE: 108 BPM | TEMPERATURE: 99.7 F | SYSTOLIC BLOOD PRESSURE: 144 MMHG | DIASTOLIC BLOOD PRESSURE: 88 MMHG

## 2021-08-25 DIAGNOSIS — Z01.818 PREOPERATIVE CLEARANCE: Primary | ICD-10-CM

## 2021-08-25 DIAGNOSIS — M17.11 PRIMARY OSTEOARTHRITIS OF RIGHT KNEE: ICD-10-CM

## 2021-08-25 PROCEDURE — 99214 OFFICE O/P EST MOD 30 MIN: CPT | Performed by: NURSE PRACTITIONER

## 2021-08-25 SDOH — ECONOMIC STABILITY: FOOD INSECURITY: WITHIN THE PAST 12 MONTHS, THE FOOD YOU BOUGHT JUST DIDN'T LAST AND YOU DIDN'T HAVE MONEY TO GET MORE.: NEVER TRUE

## 2021-08-25 SDOH — ECONOMIC STABILITY: FOOD INSECURITY: WITHIN THE PAST 12 MONTHS, YOU WORRIED THAT YOUR FOOD WOULD RUN OUT BEFORE YOU GOT MONEY TO BUY MORE.: NEVER TRUE

## 2021-08-25 ASSESSMENT — SOCIAL DETERMINANTS OF HEALTH (SDOH): HOW HARD IS IT FOR YOU TO PAY FOR THE VERY BASICS LIKE FOOD, HOUSING, MEDICAL CARE, AND HEATING?: NOT HARD AT ALL

## 2021-08-25 NOTE — PROGRESS NOTES
Name: Sari Crawford  : 1950         Chief Complaint:     Chief Complaint   Patient presents with   [facility] Scheduling     Surgery clearance for right total knee. History of Present Illness:      Sari Crawford is a 73 y.o.  female who presents with Surgery Scheduling (Surgery clearance for right total knee. )      Francisco Iyer is here today for a pre op clearance. She is suppose to have a total right knee arthroplasty on 2021 with Dr. Anjelica Pike. She had a preop chest x-ray that showed nodular density in her right lower lobe. It was recommended that she have a CT scan done. She has a CT scan ordered for this Friday to further assess. She denies any shortness of breath, chest pain, cough, hemoptysis or unexpected weight change. She reports that she smoked casually when she was younger but quit years ago. She had blood work done for preop clearance which was stable. EKG showed normal sinus rhythm with probable left atrial enlargement. She has a history of hypertension and her blood pressure has been running [id number] systolic at home. She follows with endocrinology for history of hypothyroidism. She reports her most recent thyroid testing in February was within normal limits. She is currently taking Synthroid 75 mcg 6 days a week. She has no previous history of COPD or coronary artery disease.         Past Medical History:     Past Medical History:   Diagnosis Date    GERD (gastroesophageal reflux disease)     Hypertension       Reviewed all health maintenance requirements and ordered appropriate tests  Health Maintenance Due   Topic Date Due    Colon Cancer Screen FIT/FOBT  10/29/2020    TSH testing  2021    Potassium monitoring  2021    Creatinine monitoring  2021       Past Surgical History:     Past Surgical History:   Procedure Laterality Date    BREAST BIOPSY      TONSILLECTOMY      TUBAL LIGATION          Medications:       Prior to Admission medications Medication Sig Start Date End Date Taking? Authorizing Provider   bimatoprost (LUMIGAN) 0.01 % SOLN ophthalmic drops Apply 1 drop to eye nightly   Yes Historical Provider, MD   lisinopril (PRINIVIL;ZESTRIL) 20 MG tablet Take 1 tablet by mouth daily 4/15/21  Yes CHRISTIANO Santos CNP   busPIRone (BUSPAR) 5 MG tablet Take 1 tablet by mouth 2 times daily 4/15/21  Yes CHRISTIANO Santos CNP   amLODIPine (NORVASC) 2.5 MG tablet Take 1 tablet by mouth daily 4/15/21  Yes CHRISTIANO Santos CNP   acetaminophen (TYLENOL) 500 MG tablet Take 500 mg by mouth every 6 hours as needed   Yes Historical Provider, MD   levothyroxine (SYNTHROID) 75 MCG tablet TAKE 1 TABLET BY MOUTH ONCE DAILY ON AN EMPTY STOMACH 30 MINUTES BEFORE BREAKFAST WITH WATER ONLY 11/22/19  Yes Historical Provider, MD   timolol (TIMOPTIC) 0.5 % ophthalmic solution INSTILL 1 DROP INTO RIGHT EYE TWICE DAILY  Patient not taking: Reported on 8/25/2021 12/6/19   Historical Provider, MD   Vitamin D (CHOLECALCIFEROL) 25 MCG (1000 UT) TABS tablet Take 1 tablet by mouth daily  Patient not taking: Reported on 8/25/2021 10/30/19   CHRISTIANO Kessler CNP        Allergies:       Patient has no known allergies. Social History:     Tobacco:    reports that she has never smoked. She has never used smokeless tobacco.  Alcohol:      reports no history of alcohol use. Drug Use:  reports no history of drug use. Family History:     Family History   Problem Relation Age of Onset    High Blood Pressure Father     High Blood Pressure Sister     High Blood Pressure Other        Review of Systems:     Positive and Negative as described in HPI    Review of Systems   Constitutional: Negative for activity change, appetite change, fatigue and fever. HENT: Negative for congestion, postnasal drip, rhinorrhea, sneezing and sore throat. Eyes: Negative for photophobia, pain, discharge, redness, itching and visual disturbance.    Respiratory: Negative for cough, chest tightness, shortness of breath and wheezing. Cardiovascular: Negative for chest pain, palpitations and leg swelling. Gastrointestinal: Negative for diarrhea, nausea and vomiting. Genitourinary: Negative for difficulty urinating and dysuria. Musculoskeletal: Positive for arthralgias (Right knee). Skin: Negative for rash and wound. Neurological: Negative for dizziness and headaches. Psychiatric/Behavioral: Negative for agitation, decreased concentration, dysphoric mood and sleep disturbance. The patient is nervous/anxious (Stable). Physical Exam:   Vitals:  BP (!) 144/88 (Site: Right Upper Arm, Position: Sitting, Cuff Size: Large Adult)   Pulse 108   Temp 99.7 °F (37.6 °C) (Temporal)   Resp 18   Ht 5' (1.524 m)   Wt 137 lb 12.8 oz (62.5 kg)   SpO2 97%   BMI 26.91 kg/m²     Physical Exam  Vitals and nursing note reviewed. Constitutional:       General: She is not in acute distress. Appearance: Normal appearance. She is not toxic-appearing or diaphoretic. HENT:      Head: Normocephalic and atraumatic. Right Ear: There is no impacted cerumen. Left Ear: There is no impacted cerumen. Nose: Nose normal. No congestion or rhinorrhea. Mouth/Throat:      Mouth: Mucous membranes are moist.      Pharynx: Oropharynx is clear. No oropharyngeal exudate. Eyes:      General:         Right eye: No discharge. Left eye: No discharge. Conjunctiva/sclera: Conjunctivae normal.   Cardiovascular:      Rate and Rhythm: Normal rate and regular rhythm. Pulses:           Radial pulses are 2+ on the right side and 2+ on the left side. Heart sounds: Normal heart sounds, S1 normal and S2 normal. No murmur heard. Pulmonary:      Effort: Pulmonary effort is normal. No respiratory distress. Breath sounds: Normal breath sounds. No wheezing, rhonchi or rales. Abdominal:      General: There is no distension. Palpations: Abdomen is soft.       Tenderness: There is no abdominal tenderness. Musculoskeletal:      Cervical back: Normal range of motion and neck supple. Right lower leg: No edema. Left lower leg: No edema. Lymphadenopathy:      Cervical: No cervical adenopathy. Skin:     General: Skin is warm and dry. Findings: No erythema or rash. Neurological:      General: No focal deficit present. Mental Status: She is alert and oriented to person, place, and time. Psychiatric:         Mood and Affect: Mood is anxious (  At baseline). Mood is not depressed. Affect is not angry. Behavior: Behavior is not agitated or aggressive. Data:     Lab Results   Component Value Date     07/16/2020    K 3.9 07/16/2020     07/16/2020    CO2 26 07/16/2020    BUN 11 07/16/2020    CREATININE 0.51 07/16/2020    GLUCOSE 95 07/16/2020    PROT 8.1 10/29/2019    LABALBU 4.5 10/29/2019    BILITOT 0.47 10/29/2019    ALKPHOS 72 10/29/2019    AST 19 07/16/2020    ALT 15 07/16/2020     Lab Results   Component Value Date    WBC 7.8 10/29/2019    RBC 4.30 10/29/2019    HGB 13.7 10/29/2019    HCT 42.0 10/29/2019    MCV 97.7 10/29/2019    MCH 31.9 10/29/2019    MCHC 32.6 10/29/2019    RDW 12.0 10/29/2019     10/29/2019    MPV 10.0 10/29/2019     Lab Results   Component Value Date    TSH 0.08 07/16/2020     Lab Results   Component Value Date    CHOL 239 07/16/2020    HDL 61 07/16/2020       Assessment/Plan:      Diagnosis Orders   1. Preoperative clearance     2. Primary osteoarthritis of right knee       Patient had blood work and EKG which were stable. Preop chest x-ray showed nodular density in her right lower lobe. A CT scan has been ordered for this Friday. If findings on CT scan benign will clear for surgery September 1.      1.  Tomer Gan received counseling on the following healthy behaviors: nutrition, exercise and medication adherence  2. Patient given educational materials - see patient instructions  3.   Was a self-tracking handout given in paper form or via [employer]? No  If yes, see orders or list here. 4.  Discussed use, benefit, and side effects of prescribed medications. Barriers to medication compliance addressed. All patient questions answered. Pt voiced understanding. 5.  Reviewed prior labs and health maintenance  6. Continue current medications, diet and exercise. Completed Refills   Requested Prescriptions      No prescriptions requested or ordered in this encounter         Return if symptoms worsen or fail to improve.

## 2021-08-27 ENCOUNTER — TELEPHONE (OUTPATIENT)
Dept: PRIMARY CARE CLINIC | Age: 71
End: 2021-08-27

## 2021-08-27 ENCOUNTER — HOSPITAL ENCOUNTER (OUTPATIENT)
Dept: CT IMAGING | Age: 71
Discharge: HOME | End: 2021-08-29
Payer: MEDICARE

## 2021-08-27 DIAGNOSIS — R91.1 LUNG NODULE: Primary | ICD-10-CM

## 2021-08-27 DIAGNOSIS — R93.89 NODULAR RADIOLOGIC DENSITY: ICD-10-CM

## 2021-08-27 PROCEDURE — 71260 CT THORAX DX C+: CPT

## 2021-08-27 PROCEDURE — 6360000004 HC RX CONTRAST MEDICATION: Performed by: NURSE PRACTITIONER

## 2021-08-27 RX ADMIN — IOPAMIDOL 75 ML: 755 INJECTION, SOLUTION INTRAVENOUS at 11:30

## 2021-08-27 ASSESSMENT — ENCOUNTER SYMPTOMS
SORE THROAT: 0
PHOTOPHOBIA: 0
EYE REDNESS: 0
EYE DISCHARGE: 0
DIARRHEA: 0
NAUSEA: 0
VOMITING: 0
CHEST TIGHTNESS: 0
EYE ITCHING: 0
RHINORRHEA: 0
COUGH: 0
EYE PAIN: 0
WHEEZING: 0
SHORTNESS OF BREATH: 0

## 2021-08-30 ENCOUNTER — TELEPHONE (OUTPATIENT)
Dept: PRIMARY CARE CLINIC | Age: 71
End: 2021-08-30

## 2021-08-30 DIAGNOSIS — R91.1 LUNG NODULE: Primary | ICD-10-CM

## 2021-08-30 RX ORDER — BUSPIRONE HYDROCHLORIDE 7.5 MG/1
7.5 TABLET ORAL 2 TIMES DAILY
Qty: 60 TABLET | Refills: 2 | Status: SHIPPED | OUTPATIENT
Start: 2021-08-30 | End: 2021-11-28

## 2021-08-30 NOTE — TELEPHONE ENCOUNTER
Silva Lui @ [address] [place] [address] ([phone], Opt 2) called regarding pre-op clearance 9/2 surg. China Simon told [address] [place] [address] she is not cleared for surgery. Brenda needs to confirm from PCP. Is China Simon cleared for 9/2 surgery?

## 2021-08-30 NOTE — TELEPHONE ENCOUNTER
Can you please contact pulmonology to get patient an appointment as soon as we can for suspicious lung nodule seen on CT scan. Also contact [facility] and let her know I am going to send a prescription for the 7.5 mg BuSpar so that she does not have to cut the 5 mg in half. If she feels like this does not help with anxiety please have her schedule an appointment.   Thank you

## 2021-08-30 NOTE — TELEPHONE ENCOUNTER
Contacted Pulmonology in regards to getting patient scheduled. Pulmonology left patient a message to get scheduled with Dr. Latoya Abad for a sooner appointment. Left message to patient in regards to Buspar being sent to pharmacy. Informed patient to call the office with any questions.

## 2021-09-23 ENCOUNTER — OFFICE VISIT (OUTPATIENT)
Dept: PULMONOLOGY | Age: 71
End: 2021-09-23
Payer: MEDICARE

## 2021-09-23 VITALS
OXYGEN SATURATION: 97 % | HEIGHT: 60 IN | SYSTOLIC BLOOD PRESSURE: 158 MMHG | WEIGHT: 138.9 LBS | RESPIRATION RATE: 20 BRPM | TEMPERATURE: 99.2 F | HEART RATE: 117 BPM | DIASTOLIC BLOOD PRESSURE: 97 MMHG | BODY MASS INDEX: 27.27 KG/M2

## 2021-09-23 DIAGNOSIS — F41.1 GENERALIZED ANXIETY DISORDER: ICD-10-CM

## 2021-09-23 DIAGNOSIS — R91.1 LUNG NODULE, SOLITARY: Primary | ICD-10-CM

## 2021-09-23 DIAGNOSIS — M17.0 PRIMARY OSTEOARTHRITIS OF BOTH KNEES: ICD-10-CM

## 2021-09-23 PROCEDURE — 99204 OFFICE O/P NEW MOD 45 MIN: CPT | Performed by: INTERNAL MEDICINE

## 2021-09-23 ASSESSMENT — ENCOUNTER SYMPTOMS
RESPIRATORY NEGATIVE: 1
GASTROINTESTINAL NEGATIVE: 1
EYES NEGATIVE: 1

## 2021-09-23 NOTE — PROGRESS NOTES
Subjective:      Patient ID: Rolly Amin is a 73 y.o. female being seen in my clinic for   Chief Complaint   Patient presents with    Abnormal Chest X-ray     Denies dyspnea or wheezing       HPI  New patient visit, referred by Kalyan Moy CNP, for evaluation of right middle lobe pulmonary nodule. Patient states that she was being cleared for knee replacement surgery. As part of the routine, chest x-ray reportedly showed a nodule in the right lung. That imaging is not available in the [facility] system. This prompted CT scan of the chest done on 2021. The radiologist reported a spiculated nodule in the right middle lobe 1.7 cm. I reviewed the study. There is a solid density in the right middle lobe however on the lateral and sagittal cuts it appears to be more's scarlike or infiltrative. No adenopathy is noted. The remainder of the study is unremarkable. I reviewed a chest x-ray done in . Specifically no abnormality was present in the region of the right middle lobe. Lifelong non-smoker. Works as a nursing aide. Family history of cancer. Sister  of lung cancer. Smoker. Patient denies shortness of breath, cough, hemoptysis, or constitutional symptoms. No weight loss or decreased appetite. No chest pain. Suffers from degenerative joint disease. Denies tuberculosis. No history of recent pneumonia. Received both of her Covid vaccinations.     Current Outpatient Medications   Medication Sig Dispense Refill    busPIRone (BUSPAR) 7.5 MG tablet Take 1 tablet by mouth 2 times daily 60 tablet 2    bimatoprost (LUMIGAN) 0.01 % SOLN ophthalmic drops Apply 1 drop to eye nightly      lisinopril (PRINIVIL;ZESTRIL) 20 MG tablet Take 1 tablet by mouth daily 90 tablet 1    amLODIPine (NORVASC) 2.5 MG tablet Take 1 tablet by mouth daily 90 tablet 1    acetaminophen (TYLENOL) 500 MG tablet Take 500 mg by mouth every 6 hours as needed      levothyroxine (SYNTHROID) 75 MCG tablet Takes 6 days/week       No current facility-administered medications for this visit. Family History   Problem Relation Age of Onset    High Blood Pressure Father     High Blood Pressure Sister     High Blood Pressure Other        Social History     Tobacco Use    Smoking status: Never Smoker    Smokeless tobacco: Never Used   Vaping Use    Vaping Use: Never used   Substance Use Topics    Alcohol use: No    Drug use: No       Review of Systems   Constitutional: Negative. HENT: Negative. Eyes: Negative. Respiratory: Negative. Cardiovascular: Negative. Gastrointestinal: Negative. Musculoskeletal: Positive for arthralgias and gait problem. Psychiatric/Behavioral: The patient is nervous/anxious. All other systems reviewed and are negative. Objective:     Vitals:    09/23/21 1423 09/23/21 1425   BP: (!) 154/96 (!) 158/97   Site: Left Upper Arm Left Upper Arm   Position: Sitting Sitting   Cuff Size: Medium Adult Medium Adult   Pulse: 119 117   Resp: 20    Temp: 99.2 °F (37.3 °C)    TempSrc: Tympanic    SpO2: 97%    Weight: 138 lb 14.4 oz (63 kg)    Height: 5' (1.524 m)      Physical Exam  Vitals and nursing note reviewed. Constitutional:       Appearance: She is well-developed. Comments: Tearful, upset, quite anxious. HENT:      Head: Normocephalic. Nose: Nose normal. No congestion. Mouth/Throat:      Mouth: Mucous membranes are moist.      Pharynx: Oropharynx is clear. No oropharyngeal exudate. Eyes:      General: No scleral icterus. Conjunctiva/sclera: Conjunctivae normal.   Neck:      Thyroid: No thyromegaly. Vascular: No JVD. Trachea: No tracheal deviation. Cardiovascular:      Rate and Rhythm: Normal rate and regular rhythm. Heart sounds: Normal heart sounds. No murmur heard. No gallop. Pulmonary:      Effort: Pulmonary effort is normal. No respiratory distress. Breath sounds: No wheezing or rales.    Chest:      Chest wall: No tenderness. Abdominal:      Palpations: Abdomen is soft. Tenderness: There is no abdominal tenderness. Musculoskeletal:      Cervical back: Neck supple. Right lower leg: No edema. Left lower leg: No edema. Lymphadenopathy:      Cervical: No cervical adenopathy. Skin:     General: Skin is warm and dry. Neurological:      General: No focal deficit present. Mental Status: She is alert and oriented to person, place, and time. Wt Readings from Last 3 Encounters:   09/23/21 138 lb 14.4 oz (63 kg)   08/25/21 137 lb 12.8 oz (62.5 kg)   04/15/21 140 lb 12.8 oz (63.9 kg)     Results for orders placed or performed in visit on 61/72/77   Basic Metabolic Panel   Result Value Ref Range    Glucose 95 65 - 99 mg/dL    BUN 11 5 - 27 mg/dL    CREATININE 0.51 0.40 - 1.00 mg/dL    eGFR African American >60 >59 ml/min/1.73sq.m    EGFR IF NonAfrican American >60 >59 ml/min/1.73sq. m    Calcium 9.2 8.5 - 10.5 mg/dL    Sodium 141 134 - 146 mmol/L    Potassium 3.9 3.5 - 5.0 mmol/L    Chloride 103 98 - 109 mmol/L    CO2 26 22 - 32 mmol/L    Anion Gap 12 5 - 15 mmol/L   AST/SGOT Aspartate Transaminase   Result Value Ref Range    AST 19 0 - 41 U/L   ALT/SGPT Alanine Transaminase   Result Value Ref Range    ALT 15 0 - 31 U/L   Lipid Panel w/ Reflex Direct LDL   Result Value Ref Range    Cholesterol 239 (H) 150 - 200 mg/dL    Triglycerides 142 27 - 150 mg/dL    HDL 61 >39 mg/dL    LDL Calculated 150 (H) <130 mg/dL    VLDL Cholesterol Calculated 28 0 - 30 mg/dL    LDl/HDL Ratio 2.5 <3.5    Chol/HDL Ratio 3.9 1.0 - 5.0   FREE T4   Result Value Ref Range    T4 Free 1.13 0.61 - 1.60 ng/dL   TSH without Reflex   Result Value Ref Range    TSH 0.08 (L) 0.49 - 4.67 uIU/mL       Assessment:      1. Lung nodule, solitary    2. Primary osteoarthritis of both knees    3.  Generalized anxiety disorder      Patient Active Problem List   Diagnosis    Multinodular goiter    Hypertriglyceridemia    Chondromalacia of knee    Elevated blood pressure reading    Essential hypertension    Dyslipidemia    Vitamin D deficiency    Anxiety    Positive FIT (fecal immunochemical test)    Congenital hypothyroidism without goiter         Plan:      1. Etiology of nodular density unclear but given size of 1.7 cm further evaluation necessary. 2. Whole-body PET scan. If FDG avid, will need transthoracic needle aspiration. If FDG negative, then recommend continued observation. 3. Encouraged flu shot. 4. Discussed in detail with patient. Thanks for the kind referral.  We will keep you posted as the work-up proceeds. No orders of the defined types were placed in this encounter. Orders Placed This Encounter   Procedures    PET CT SKULL BASE TO MID THIGH     Standing Status:   Future     Standing Expiration Date:   9/23/2022     Order Specific Question:   Reason for exam:     Answer:   1.7 cm RML nodule     Return for After testing complete.      Electronically signed by Harlan Chen DO on 9/23/2021 at 2:46 PM

## 2021-09-29 ENCOUNTER — HOSPITAL ENCOUNTER (OUTPATIENT)
Dept: PET IMAGING | Age: 71
Discharge: HOME | End: 2021-10-01
Payer: MEDICARE

## 2021-09-29 DIAGNOSIS — R91.1 LUNG NODULE, SOLITARY: ICD-10-CM

## 2021-09-29 PROCEDURE — 78815 PET IMAGE W/CT SKULL-THIGH: CPT

## 2021-09-29 PROCEDURE — A9552 F18 FDG: HCPCS | Performed by: INTERNAL MEDICINE

## 2021-09-29 PROCEDURE — 3430000000 HC RX DIAGNOSTIC RADIOPHARMACEUTICAL: Performed by: INTERNAL MEDICINE

## 2021-09-29 RX ORDER — FLUDEOXYGLUCOSE F 18 200 MCI/ML
15.46 INJECTION, SOLUTION INTRAVENOUS
Status: COMPLETED | OUTPATIENT
Start: 2021-09-29 | End: 2021-09-29

## 2021-09-29 RX ADMIN — FLUDEOXYGLUCOSE F 18 15.46 MILLICURIE: 200 INJECTION, SOLUTION INTRAVENOUS at 12:44

## 2021-10-01 ENCOUNTER — TELEPHONE (OUTPATIENT)
Dept: PULMONOLOGY | Age: 71
End: 2021-10-01

## 2021-10-01 DIAGNOSIS — R91.1 INCIDENTAL LUNG NODULE: Primary | ICD-10-CM

## 2021-10-01 NOTE — TELEPHONE ENCOUNTER
Reviewed results. Minimal uptake, which is good news. Not likely cancer, but need to follow to make sure doesn't grow. Repeat CT chest in 3 mos with f/u appt.

## 2021-10-01 NOTE — TELEPHONE ENCOUNTER
Dr. Opal Montalvo,  Patient was here and she has had her PET Scan, and is wondering what to do next. Please advise.

## 2021-12-15 ENCOUNTER — OFFICE VISIT (OUTPATIENT)
Dept: PRIMARY CARE CLINIC | Age: 71
End: 2021-12-15
Payer: MEDICARE

## 2021-12-15 VITALS
RESPIRATION RATE: 18 BRPM | HEIGHT: 60 IN | HEART RATE: 115 BPM | SYSTOLIC BLOOD PRESSURE: 128 MMHG | WEIGHT: 144 LBS | TEMPERATURE: 99.2 F | DIASTOLIC BLOOD PRESSURE: 77 MMHG | BODY MASS INDEX: 28.27 KG/M2 | OXYGEN SATURATION: 97 %

## 2021-12-15 DIAGNOSIS — F41.1 GAD (GENERALIZED ANXIETY DISORDER): ICD-10-CM

## 2021-12-15 DIAGNOSIS — I10 ESSENTIAL HYPERTENSION: Primary | ICD-10-CM

## 2021-12-15 DIAGNOSIS — E03.1 CONGENITAL HYPOTHYROIDISM WITHOUT GOITER: ICD-10-CM

## 2021-12-15 PROCEDURE — 99214 OFFICE O/P EST MOD 30 MIN: CPT | Performed by: NURSE PRACTITIONER

## 2021-12-15 RX ORDER — AMLODIPINE BESYLATE 5 MG/1
5 TABLET ORAL DAILY
Qty: 90 TABLET | Refills: 2 | Status: SHIPPED | OUTPATIENT
Start: 2021-12-15 | End: 2022-09-01

## 2021-12-15 RX ORDER — LATANOPROST 50 UG/ML
SOLUTION/ DROPS OPHTHALMIC
COMMUNITY
Start: 2021-10-09

## 2021-12-15 RX ORDER — BUSPIRONE HYDROCHLORIDE 5 MG/1
5 TABLET ORAL 2 TIMES DAILY
Qty: 60 TABLET | Refills: 0 | Status: SHIPPED | OUTPATIENT
Start: 2021-12-15 | End: 2021-12-22

## 2021-12-15 NOTE — PATIENT INSTRUCTIONS
SURVEY:     You may be receiving a survey from [employer] regarding your visit today. Please complete the survey to enable us to provide the highest quality of care to you and your family. If you cannot score us a very good on any question, please call the office to discuss how we could have made your experience a very good one. Thank you.   Cathi Knight, APRN-MEGA Frost, CNP  Adela Smart, LPN  Kvng Cotto, LENNIE Morales, RMA  Amor Hillman, CMA  Gerda, CMA  Shawna, PCA

## 2021-12-15 NOTE — PROGRESS NOTES
Name: Joe Perez  : 1950         Chief Complaint:     Chief Complaint   Patient presents with    Hypertension     Check up. History of Present Illness:      Joe Perez is a 73 y.o.  female who presents with Hypertension (Check up. )    Charity Hickey is here today for a 4 month blood pressure check up. She is no longer taking the Lisinopril and is taking  Norvasc at 5 mg daily which is a dose she had been taking in the past.  She states she does get white coat syndrome when at the doctor. She is following up with Pulmonology Dr. Mikel Fuller for a lung nodule. She is having a 3 month repeat CT of chest for close follow up. She states her anxiety has been controlled and taking Buspar twice a day. Past Medical History:     Past Medical History:   Diagnosis Date    GERD (gastroesophageal reflux disease)     Hypertension       Reviewed all health maintenance requirements and ordered appropriate tests  Health Maintenance Due   Topic Date Due    Colon Cancer Screen FIT/FOBT  10/29/2020    Annual Wellness Visit (AWV)  2020    TSH testing  2021    Potassium monitoring  2021    Creatinine monitoring  2021    Flu vaccine (1) 2021    Breast cancer screen  2021       Past Surgical History:     Past Surgical History:   Procedure Laterality Date    BREAST BIOPSY      TONSILLECTOMY      TUBAL LIGATION          Medications:       Prior to Admission medications    Medication Sig Start Date End Date Taking?  Authorizing Provider   latanoprost (XALATAN) 0.005 % ophthalmic solution  10/9/21  Yes Historical Provider, MD   amLODIPine (NORVASC) 5 MG tablet Take 1 tablet by mouth daily 12/15/21 3/15/22 Yes CHRISTIANO Torrez CNP   busPIRone (BUSPAR) 5 MG tablet Take 1 tablet by mouth 2 times daily 12/15/21 1/14/22 Yes CHRISTIANO Torrez CNP   bimatoprost (LUMIGAN) 0.01 % SOLN ophthalmic drops Apply 1 drop to eye nightly   Yes Historical Provider, MD   acetaminophen Effort: Pulmonary effort is normal.      Breath sounds: Normal breath sounds. Musculoskeletal:         General: Normal range of motion. Cervical back: Normal range of motion. Lymphadenopathy:      Cervical: No cervical adenopathy. Skin:     General: Skin is warm. Capillary Refill: Capillary refill takes less than 2 seconds. Neurological:      General: No focal deficit present. Mental Status: She is alert and oriented to person, place, and time. Psychiatric:         Mood and Affect: Mood normal.         Data:     Lab Results   Component Value Date     07/16/2020    K 3.9 07/16/2020     07/16/2020    CO2 26 07/16/2020    BUN 11 07/16/2020    CREATININE 0.51 07/16/2020    GLUCOSE 95 07/16/2020    PROT 8.1 10/29/2019    LABALBU 4.5 10/29/2019    BILITOT 0.47 10/29/2019    ALKPHOS 72 10/29/2019    AST 19 07/16/2020    ALT 15 07/16/2020     Lab Results   Component Value Date    WBC 7.8 10/29/2019    RBC 4.30 10/29/2019    HGB 13.7 10/29/2019    HCT 42.0 10/29/2019    MCV 97.7 10/29/2019    MCH 31.9 10/29/2019    MCHC 32.6 10/29/2019    RDW 12.0 10/29/2019     10/29/2019    MPV 10.0 10/29/2019     Lab Results   Component Value Date    TSH 0.08 07/16/2020     Lab Results   Component Value Date    CHOL 239 07/16/2020    HDL 61 07/16/2020       Assessment/Plan:      Diagnosis Orders   1. Essential hypertension     2. EFREN (generalized anxiety disorder)     3. Congenital hypothyroidism without goiter       Essential Hypertension-Continue taking Norvasc 5 mg daily. Continue making lifestyle modifications. Generalized Anxiety-Continue with Buspar 5 mg twice a day. Hypothyroidism-Continue Synthroid 75 mcg daily. 1.  Brett Hanson received counseling on the following healthy behaviors: nutrition, exercise and medication adherence  2. Patient given educational materials - see patient instructions  3. Was a self-tracking handout given in paper form or via [employer]?  No  If yes, see orders or list here. 4.  Discussed use, benefit, and side effects of prescribed medications. Barriers to medication compliance addressed. All patient questions answered. Pt voiced understanding. 5.  Reviewed prior labs and health maintenance  6. Continue current medications, diet and exercise. Completed Refills   Requested Prescriptions     Signed Prescriptions Disp Refills    amLODIPine (NORVASC) 5 MG tablet 90 tablet 2     Sig: Take 1 tablet by mouth daily    busPIRone (BUSPAR) 5 MG tablet 60 tablet 0     Sig: Take 1 tablet by mouth 2 times daily         Return in about 6 months (around 6/15/2022).

## 2021-12-20 ASSESSMENT — ENCOUNTER SYMPTOMS
EYES NEGATIVE: 1
ALLERGIC/IMMUNOLOGIC NEGATIVE: 1
GASTROINTESTINAL NEGATIVE: 1
RESPIRATORY NEGATIVE: 1

## 2021-12-22 ENCOUNTER — TELEPHONE (OUTPATIENT)
Dept: PRIMARY CARE CLINIC | Age: 71
End: 2021-12-22

## 2021-12-22 RX ORDER — BUSPIRONE HYDROCHLORIDE 5 MG/1
5 TABLET ORAL 2 TIMES DAILY
Qty: 180 TABLET | Refills: 1 | Status: SHIPPED | OUTPATIENT
Start: 2021-12-22 | End: 2022-03-22

## 2021-12-22 NOTE — TELEPHONE ENCOUNTER
Brody Ochoa would like you to cancel the current buspirone order as it is for 30 days. Brody Ochoa states it is actually cheaper monthly if she gets a 90 day supply. Will you re-order and send to ?

## 2022-02-07 ENCOUNTER — HOSPITAL ENCOUNTER (OUTPATIENT)
Dept: CT IMAGING | Age: 72
Discharge: HOME | End: 2022-02-09
Payer: MEDICARE

## 2022-02-07 DIAGNOSIS — R91.1 INCIDENTAL LUNG NODULE: ICD-10-CM

## 2022-02-07 PROCEDURE — 71250 CT THORAX DX C-: CPT

## 2022-02-07 NOTE — PROGRESS NOTES
disease. CT chest 2021: No significant mediastinal or hilar adenopathy. Spiculated appearing nodule involving the right middle lobe measuring 1.7 x 1.1 cm. No airspace consolidation, pleural effusion or pneumothorax. Sleep Study:  None on chart    Laboratory Evaluation:    Immunizations:   Immunization History   Administered Date(s) Administered    COVID-19, Kendra Pleva, Primary or Immunocompromised, PF, 100mcg/0.5mL 2021, 02/10/2021, 2021    Influenza Virus Vaccine 2019    Pneumococcal Polysaccharide ([id number]) 2017    Zoster Live (Zostavax) 2002        No flowsheet data found.     /83   Pulse 99   Temp 98.2 °F (36.8 °C)   Resp 16   Ht 5' (1.524 m)   Wt 144 lb 12.8 oz (65.7 kg)   SpO2 98%   BMI 28.28 kg/m²     Past Medical History:   Diagnosis Date    GERD (gastroesophageal reflux disease)     Hypertension      Past Surgical History:   Procedure Laterality Date    BREAST BIOPSY      TONSILLECTOMY      TUBAL LIGATION       Family History   Problem Relation Age of Onset    High Blood Pressure Father     High Blood Pressure Sister     High Blood Pressure Other        Social History     Socioeconomic History    Marital status:      Spouse name: Not on file    Number of children: Not on file    Years of education: Not on file    Highest education level: Not on file   Occupational History    Not on file   Tobacco Use    Smoking status: Former Smoker     Packs/day: 1.00     Years: 20.00     Pack years: 20.00     Types: Cigarettes     Quit date:      Years since quittin.1    Smokeless tobacco: Never Used   Vaping Use    Vaping Use: Never used   Substance and Sexual Activity    Alcohol use: No    Drug use: No    Sexual activity: Yes   Other Topics Concern    Not on file   Social History Narrative    Not on file     Social Determinants of Health     Financial Resource Strain: Low Risk     Difficulty of Paying Living Expenses: Not hard at all   Food Insecurity: No Food Insecurity    Worried About [facility] in the Last Year: Never true    Mindy of Food in the Last Year: Never true   Transportation Needs:     Lack of Transportation (Medical): Not on file    Lack of Transportation (Non-Medical): Not on file   Physical Activity:     Days of Exercise per Week: Not on file    Minutes of Exercise per Session: Not on file   Stress:     Feeling of Stress : Not on file   Social Connections:     Frequency of Communication with Friends and Family: Not on file    Frequency of Social Gatherings with Friends and Family: Not on file    Attends Hindu Services: Not on file    Active Member of [address] [place] [address] or Organizations: Not on file    Attends Club or Organization Meetings: Not on file    Marital Status: Not on file   Intimate Partner Violence:     Fear of Current or Ex-Partner: Not on file    Emotionally Abused: Not on file    Physically Abused: Not on file    Sexually Abused: Not on file   Housing Stability:     Unable to Pay for Housing in the Last Year: Not on file    Number of [place] in the Last Year: Not on file    Unstable Housing in the Last Year: Not on file       Review of Systems   Constitutional: Negative for fatigue and fever. HENT: Negative for postnasal drip, rhinorrhea, sinus pressure and sinus pain. Respiratory: Negative for cough, chest tightness, shortness of breath, wheezing and stridor. Cardiovascular: Negative for chest pain and leg swelling. Gastrointestinal: Negative for constipation, diarrhea, nausea and vomiting. Genitourinary: Negative for dysuria, frequency and urgency. Musculoskeletal: Negative for arthralgias, joint swelling and myalgias. Skin: Negative for rash. Neurological: Negative for dizziness, speech difficulty and headaches. Hematological: Does not bruise/bleed easily.    Psychiatric/Behavioral: Negative for agitation, hallucinations, sleep disturbance and suicidal Ratio 2.5 <3.5    Chol/HDL Ratio 3.9 1.0 - 5.0   FREE T4   Result Value Ref Range    T4 Free 1.13 0.61 - 1.60 ng/dL   TSH without Reflex   Result Value Ref Range    TSH 0.08 (L) 0.49 - 4.67 uIU/mL       No results found. Assessment:      1. Nodule of right lung          Plan:      1. No pulmonary medications. 2. Recommend flu vaccination in the fall annually. 3. Patient is up-to-date with pneumococcal vaccinations from pulmonary perspective. 4. Patient has received Covid vaccination and booster. Discussed strategies to protect against Covid 19.   5. Maintain an active lifestyle. 6. Patient's questions were answered to her satisfaction. 9. Former smoker quit 1990 with a pack year history of 20  8. CT scan of the chest was reviewed along with PET scan. Repeat CT scan in August 2022  9.  We'll see the patient back in 6 months after CT scan          Electronically signed by CHRISTIANO Alvares CNP on 2/10/2022 at 9:35 AM

## 2022-02-10 ENCOUNTER — OFFICE VISIT (OUTPATIENT)
Dept: PULMONOLOGY | Age: 72
End: 2022-02-10
Payer: MEDICARE

## 2022-02-10 VITALS
SYSTOLIC BLOOD PRESSURE: 133 MMHG | WEIGHT: 144.8 LBS | RESPIRATION RATE: 16 BRPM | DIASTOLIC BLOOD PRESSURE: 83 MMHG | BODY MASS INDEX: 28.43 KG/M2 | HEIGHT: 60 IN | HEART RATE: 99 BPM | OXYGEN SATURATION: 98 % | TEMPERATURE: 98.2 F

## 2022-02-10 DIAGNOSIS — R91.1 NODULE OF RIGHT LUNG: Primary | ICD-10-CM

## 2022-02-10 PROCEDURE — 99213 OFFICE O/P EST LOW 20 MIN: CPT | Performed by: NURSE PRACTITIONER

## 2022-02-10 ASSESSMENT — ENCOUNTER SYMPTOMS
VOMITING: 0
COUGH: 0
DIARRHEA: 0
STRIDOR: 0
SHORTNESS OF BREATH: 0
SINUS PRESSURE: 0
RHINORRHEA: 0
NAUSEA: 0
WHEEZING: 0
CONSTIPATION: 0
CHEST TIGHTNESS: 0
SINUS PAIN: 0

## 2022-02-10 NOTE — PATIENT INSTRUCTIONS
SURVEY:    You may be receiving a survey from [employer] regarding your visit today. Please complete the survey to enable us to provide the highest quality of care to you and your family. If you cannot score us a very good on any question, please call the office to discuss how we could have made your experience a very good one. Thank you. Please recheck your blood pressure when you go home and make sure you take your prescribed medication if applicable . Please follow up with your PCP or ER if needed.

## 2022-03-23 ENCOUNTER — TELEPHONE (OUTPATIENT)
Dept: PRIMARY CARE CLINIC | Age: 72
End: 2022-03-23

## 2022-03-23 NOTE — TELEPHONE ENCOUNTER
Attempted to contact patient in regards to needing a mammogram and colon cancer screening. Unable to contact or leave a voicemail due to no VM set up.

## 2022-06-08 ENCOUNTER — OFFICE VISIT (OUTPATIENT)
Dept: PRIMARY CARE CLINIC | Age: 72
End: 2022-06-08
Payer: MEDICARE

## 2022-06-08 VITALS
BODY MASS INDEX: 27.76 KG/M2 | DIASTOLIC BLOOD PRESSURE: 82 MMHG | RESPIRATION RATE: 18 BRPM | HEART RATE: 107 BPM | SYSTOLIC BLOOD PRESSURE: 146 MMHG | HEIGHT: 60 IN | WEIGHT: 141.4 LBS | OXYGEN SATURATION: 96 % | TEMPERATURE: 99 F

## 2022-06-08 DIAGNOSIS — Z00.00 MEDICARE ANNUAL WELLNESS VISIT, SUBSEQUENT: Primary | ICD-10-CM

## 2022-06-08 DIAGNOSIS — I10 ESSENTIAL HYPERTENSION: ICD-10-CM

## 2022-06-08 DIAGNOSIS — Z13.220 LIPID SCREENING: ICD-10-CM

## 2022-06-08 DIAGNOSIS — Z12.31 OTHER SCREENING MAMMOGRAM: ICD-10-CM

## 2022-06-08 DIAGNOSIS — F41.1 GAD (GENERALIZED ANXIETY DISORDER): ICD-10-CM

## 2022-06-08 PROCEDURE — 1123F ACP DISCUSS/DSCN MKR DOCD: CPT | Performed by: NURSE PRACTITIONER

## 2022-06-08 PROCEDURE — G0439 PPPS, SUBSEQ VISIT: HCPCS | Performed by: NURSE PRACTITIONER

## 2022-06-08 RX ORDER — BUSPIRONE HYDROCHLORIDE 5 MG/1
5 TABLET ORAL 2 TIMES DAILY
Qty: 60 TABLET | Refills: 0 | Status: SHIPPED | OUTPATIENT
Start: 2022-06-08 | End: 2022-07-08

## 2022-06-08 RX ORDER — HYDROCHLOROTHIAZIDE 12.5 MG/1
12.5 CAPSULE, GELATIN COATED ORAL EVERY MORNING
Qty: 30 CAPSULE | Refills: 5 | Status: SHIPPED | OUTPATIENT
Start: 2022-06-08

## 2022-06-08 ASSESSMENT — PATIENT HEALTH QUESTIONNAIRE - PHQ9
SUM OF ALL RESPONSES TO PHQ QUESTIONS 1-9: 0
1. LITTLE INTEREST OR PLEASURE IN DOING THINGS: 0
SUM OF ALL RESPONSES TO PHQ QUESTIONS 1-9: 0
2. FEELING DOWN, DEPRESSED OR HOPELESS: 0
SUM OF ALL RESPONSES TO PHQ QUESTIONS 1-9: 0
SUM OF ALL RESPONSES TO PHQ QUESTIONS 1-9: 0
SUM OF ALL RESPONSES TO PHQ9 QUESTIONS 1 & 2: 0

## 2022-06-08 ASSESSMENT — LIFESTYLE VARIABLES: HOW OFTEN DO YOU HAVE A DRINK CONTAINING ALCOHOL: NEVER

## 2022-06-08 NOTE — PROGRESS NOTES
Medicare Annual Wellness Visit    Shamar Freeman is here for Hypertension (Check up. Patient would like to start back on Amlodipine and HCTZ. ) and Medicare AWV    Assessment & Plan   Medicare annual wellness visit, subsequent  Essential hypertension  -     hydroCHLOROthiazide (MICROZIDE) 12.5 MG capsule; Take 1 capsule by mouth every morning, Disp-30 capsule, R-5Normal  -     CBC; Future  -     Basic Metabolic Panel; Future  -     Vitamin D 25 Hydroxy  Other screening mammogram  -     ANTONELLA ELIZABETH DIGITAL SCREEN BILATERAL; Future  EFREN (generalized anxiety disorder)  -     busPIRone (BUSPAR) 5 MG tablet; Take 1 tablet by mouth 2 times daily, Disp-60 tablet, R-0Normal  Lipid screening  -     Lipid Panel; Future      Recommendations for Preventive Services Due: see orders and patient instructions/AVS.  Recommended screening schedule for the next 5-10 years is provided to the patient in written form: see Patient Instructions/AVS.     Return in about 6 months (around 12/8/2022). Subjective   The following acute and/or chronic problems were also addressed today:    Primary Hypertension-She states at home her BP has been in 130's. She is also having some swelling in her bilateral lower legs and would like to try Hydrochlorothiazide. She see's Dr. Jazmín Engle in [facility] for her Thyroid and has been compliant with her visits. Had TSH drawn in February    Anxiety-Trang is tearful today as she had a granddaughter pass away 3 months ago in a car accident. She declines need for antidepressant. She states she is going to go to a counselor with her daughter soon. Patient's complete Health Risk Assessment and screening values have been reviewed and are found in Flowsheets. The following problems were reviewed today and where indicated follow up appointments were made and/or referrals ordered.     Positive Risk Factor Screenings with Interventions:               General Health and ACP:  General  In general, how would you say your health is?: Good  In the past 7 days, have you experienced any of the following: New or Increased Pain, New or Increased Fatigue, Loneliness, Social Isolation, Stress or Anger?: No  Do you get the social and emotional support that you need?: Yes  Do you have a Living Will?: (!) No    Advance Directives     Power of [address] [place] Will ACP-Advance Directive ACP-Power of     Not on File Not on File Not on File Not on File      General Health Risk Interventions:  · Stress: regular exercise recommended- 3-5 times per week, 30-45 minutes per session     Hearing/Vision:  Do you or your family notice any trouble with your hearing that hasn't been managed with hearing aids?: No  Do you have difficulty driving, watching TV, or doing any of your daily activities because of your eyesight?: No  Have you had an eye exam within the past year?: (!) No  No exam data present    Hearing/Vision Interventions:  · Vision concerns:  patient encouraged to make appointment with his/her eye specialist            Objective   Vitals:    06/08/22 0800 06/08/22 0814   BP: (!) 152/80 (!) 146/82   Pulse: (!) 107    Resp: 18    Temp: 99 °F (37.2 °C)    TempSrc: Temporal    SpO2: 96%    Weight: 141 lb 6.4 oz (64.1 kg)    Height: 5' (1.524 m)       Body mass index is 27.62 kg/m².         General Appearance: alert and oriented to person, place and time, well developed and well- nourished, in no acute distress  Skin: warm and dry, no rash or erythema  Head: normocephalic and atraumatic  Eyes: pupils equal, round, and reactive to light, extraocular eye movements intact, conjunctivae normal  ENT: tympanic membrane, external ear and ear canal normal bilaterally, nose without deformity, nasal mucosa and turbinates normal without polyps  Neck: supple and non-tender without mass, no thyromegaly or thyroid nodules, no cervical lymphadenopathy  Pulmonary/Chest: clear to auscultation bilaterally- no wheezes, rales or rhonchi, normal air movement, no respiratory distress  Cardiovascular: normal rate, regular rhythm, normal S1 and S2, no murmurs, rubs, clicks, or gallops, distal pulses intact, no carotid bruits  Abdomen: soft, non-tender, non-distended, normal bowel sounds, no masses or organomegaly  Extremities: no cyanosis, clubbing or edema  Musculoskeletal: normal range of motion, no joint swelling, deformity or tenderness  Neurologic: reflexes normal and symmetric, no cranial nerve deficit, gait, coordination and speech normal       No Known Allergies  Prior to Visit Medications    Medication Sig Taking?  Authorizing Provider   hydroCHLOROthiazide (MICROZIDE) 12.5 MG capsule Take 1 capsule by mouth every morning Yes CHRISTIANO Woods CNP   busPIRone (BUSPAR) 5 MG tablet Take 1 tablet by mouth 2 times daily Yes CHRISTIANO Woods CNP   latanoprost (XALATAN) 0.005 % ophthalmic solution  Yes Historical Provider, MD   amLODIPine (NORVASC) 5 MG tablet Take 1 tablet by mouth daily Yes CHRISTIANO Woods CNP   acetaminophen (TYLENOL) 500 MG tablet Take 500 mg by mouth every 6 hours as needed Yes Historical Provider, MD   levothyroxine (SYNTHROID) 75 MCG tablet Takes 5  days/week Yes Historical Provider, MD [place] (Including outside providers/suppliers regularly involved in providing care):   Patient Care Team:  CHRISTIANO Woods CNP as PCP - General (Certified Nurse Practitioner)  CHRISTIANO Woods CNP as PCP - [facility] Empaneled Provider     Reviewed and updated this visit:  Tobacco  Allergies  Meds  Problems  Med Hx  Surg Hx  Soc Hx  Fam Hx

## 2022-06-17 ENCOUNTER — TELEPHONE (OUTPATIENT)
Dept: PRIMARY CARE CLINIC | Age: 72
End: 2022-06-17

## 2022-06-17 ENCOUNTER — HOSPITAL ENCOUNTER (OUTPATIENT)
Dept: WOMENS IMAGING | Age: 72
Discharge: HOME | End: 2022-06-19
Payer: MEDICARE

## 2022-06-17 DIAGNOSIS — R92.8 ABNORMALITY OF LEFT BREAST ON SCREENING MAMMOGRAM: Primary | ICD-10-CM

## 2022-06-17 DIAGNOSIS — Z12.31 OTHER SCREENING MAMMOGRAM: ICD-10-CM

## 2022-06-17 PROCEDURE — 77063 BREAST TOMOSYNTHESIS BI: CPT

## 2022-06-23 ENCOUNTER — HOSPITAL ENCOUNTER (OUTPATIENT)
Dept: ULTRASOUND IMAGING | Age: 72
Discharge: HOME | End: 2022-06-25
Payer: MEDICARE

## 2022-06-23 ENCOUNTER — HOSPITAL ENCOUNTER (OUTPATIENT)
Dept: WOMENS IMAGING | Age: 72
Discharge: HOME | End: 2022-06-25
Payer: MEDICARE

## 2022-06-23 DIAGNOSIS — R92.8 ABNORMALITY OF LEFT BREAST ON SCREENING MAMMOGRAM: ICD-10-CM

## 2022-06-23 PROCEDURE — G0279 TOMOSYNTHESIS, MAMMO: HCPCS

## 2022-06-24 ENCOUNTER — TELEPHONE (OUTPATIENT)
Dept: PRIMARY CARE CLINIC | Age: 72
End: 2022-06-24

## 2022-06-24 NOTE — TELEPHONE ENCOUNTER
----- Message from CHRISTIANO Desouza CNP sent at 6/23/2022  4:58 PM EDT -----  Please notify patient of normal mammogram results. Repeat in 12 months.  Thanks Bong's

## 2022-09-01 RX ORDER — AMLODIPINE BESYLATE 5 MG/1
TABLET ORAL
Qty: 90 TABLET | Refills: 0 | Status: SHIPPED | OUTPATIENT
Start: 2022-09-01

## 2022-11-09 ENCOUNTER — TELEPHONE (OUTPATIENT)
Dept: PRIMARY CARE CLINIC | Age: 72
End: 2022-11-09

## 2022-11-09 NOTE — TELEPHONE ENCOUNTER
Davi Samantha wants her thyroid retested as she feels the January 2022 results were too low. [facility] TSH/T-4 results are in chart.

## 2022-11-23 DIAGNOSIS — I10 ESSENTIAL HYPERTENSION: ICD-10-CM

## 2022-11-23 RX ORDER — HYDROCHLOROTHIAZIDE 12.5 MG/1
CAPSULE, GELATIN COATED ORAL
Qty: 90 CAPSULE | Refills: 1 | Status: SHIPPED | OUTPATIENT
Start: 2022-11-23

## 2022-11-23 NOTE — TELEPHONE ENCOUNTER
Health Maintenance   Topic Date Due    DEXA (modify frequency per FRAX score)  Never done    Colorectal Cancer Screen  10/29/2020    COVID-19 Vaccine (4 - Booster for Moderna series) 01/19/2022    Flu vaccine (1) 08/01/2022    DTaP/Tdap/Td vaccine (1 - Tdap) 06/08/2023 (Originally 2/7/1969)    Shingles vaccine (2 of 3) 06/08/2023 (Originally 4/4/2002)    Hepatitis C screen  06/08/2023 (Originally 2/7/1968)    Depression Screen  06/08/2023    Annual Wellness Visit (AWV)  06/09/2023    Breast cancer screen  06/23/2024    Lipids  07/16/2025    Pneumococcal 65+ years Vaccine  Completed    Hepatitis A vaccine  Aged Out    Hib vaccine  Aged Out    Meningococcal (ACWY) vaccine  Aged Out             (applicable per patient's age: Cancer Screenings, Depression Screening, Fall Risk Screening, Immunizations)    LDL Cholesterol (mg/dL)   Date Value   10/29/2019 116     LDL Calculated (mg/dL)   Date Value   07/16/2020 150 (H)     AST (U/L)   Date Value   07/16/2020 19     ALT (U/L)   Date Value   07/16/2020 15     BUN (mg/dL)   Date Value   07/16/2020 11      (goal A1C is < 7)   (goal LDL is <100) need 30-50% reduction from baseline     BP Readings from Last 3 Encounters:   06/08/22 (!) 146/82   02/10/22 133/83   12/15/21 128/77    (goal /80)      All Future Testing planned in [facility]:  Lab Frequency Next Occurrence   CT CHEST LOW DOSE (LDCT) Once 08/10/2022   Lipid Panel Once 06/08/2022   CBC Once 24/14/9713   Basic Metabolic Panel Once 78/20/4360       Next Visit Date:  Future Appointments   Date Time Provider Kassandra Louis   12/8/2022  8:00 AM CHRISTIANO Strickland CNP Prim Ca [facility]   1/18/2023  8:45 AM Charmayne Nodal, DO TIFF PULM [facility]   6/12/2023  8:00 AM CHRISTIANO Strickland CNP Prim Ca [facility]            Patient Active Problem List:     Multinodular goiter     Hypertriglyceridemia     Chondromalacia of knee     Elevated blood pressure reading     Essential hypertension     Dyslipidemia     Vitamin D deficiency     Anxiety     Positive FIT (fecal immunochemical test)     Congenital hypothyroidism without goiter     Nodule of right lung

## 2022-12-05 ENCOUNTER — TELEPHONE (OUTPATIENT)
Dept: PRIMARY CARE CLINIC | Age: 72
End: 2022-12-05

## 2022-12-05 DIAGNOSIS — E78.5 HYPERLIPIDEMIA, UNSPECIFIED HYPERLIPIDEMIA TYPE: Primary | ICD-10-CM

## 2022-12-05 RX ORDER — ATORVASTATIN CALCIUM 10 MG/1
10 TABLET, FILM COATED ORAL DAILY
Qty: 90 TABLET | Refills: 1 | Status: SHIPPED | OUTPATIENT
Start: 2022-12-05

## 2022-12-05 NOTE — TELEPHONE ENCOUNTER
----- Message from CHRISTIANO Burkett CNP sent at 12/5/2022 10:21 AM EST -----  Please notify patient of lab results with elevated cholesterol. I have sent a Atorvastatin to the pharmacy for her to start daily.   Thanks Altagracia Lockhart

## 2022-12-08 ENCOUNTER — OFFICE VISIT (OUTPATIENT)
Dept: PRIMARY CARE CLINIC | Age: 72
End: 2022-12-08

## 2022-12-08 VITALS
SYSTOLIC BLOOD PRESSURE: 132 MMHG | BODY MASS INDEX: 28.16 KG/M2 | RESPIRATION RATE: 18 BRPM | DIASTOLIC BLOOD PRESSURE: 82 MMHG | OXYGEN SATURATION: 98 % | WEIGHT: 144.2 LBS | TEMPERATURE: 98.2 F | HEART RATE: 86 BPM

## 2022-12-08 DIAGNOSIS — F41.1 GAD (GENERALIZED ANXIETY DISORDER): ICD-10-CM

## 2022-12-08 DIAGNOSIS — M17.11 PRIMARY OSTEOARTHRITIS OF RIGHT KNEE: ICD-10-CM

## 2022-12-08 DIAGNOSIS — R19.5 POSITIVE FIT (FECAL IMMUNOCHEMICAL TEST): ICD-10-CM

## 2022-12-08 DIAGNOSIS — I10 ESSENTIAL HYPERTENSION: Primary | ICD-10-CM

## 2022-12-08 DIAGNOSIS — E78.5 HYPERLIPIDEMIA, UNSPECIFIED HYPERLIPIDEMIA TYPE: ICD-10-CM

## 2022-12-08 RX ORDER — BUSPIRONE HYDROCHLORIDE 5 MG/1
5 TABLET ORAL 2 TIMES DAILY
COMMUNITY
Start: 2022-08-31

## 2022-12-08 SDOH — ECONOMIC STABILITY: FOOD INSECURITY: WITHIN THE PAST 12 MONTHS, YOU WORRIED THAT YOUR FOOD WOULD RUN OUT BEFORE YOU GOT MONEY TO BUY MORE.: NEVER TRUE

## 2022-12-08 SDOH — ECONOMIC STABILITY: FOOD INSECURITY: WITHIN THE PAST 12 MONTHS, THE FOOD YOU BOUGHT JUST DIDN'T LAST AND YOU DIDN'T HAVE MONEY TO GET MORE.: NEVER TRUE

## 2022-12-08 ASSESSMENT — SOCIAL DETERMINANTS OF HEALTH (SDOH): HOW HARD IS IT FOR YOU TO PAY FOR THE VERY BASICS LIKE FOOD, HOUSING, MEDICAL CARE, AND HEATING?: NOT HARD AT ALL

## 2022-12-08 ASSESSMENT — PATIENT HEALTH QUESTIONNAIRE - PHQ9
SUM OF ALL RESPONSES TO PHQ QUESTIONS 1-9: 0
SUM OF ALL RESPONSES TO PHQ QUESTIONS 1-9: 0
2. FEELING DOWN, DEPRESSED OR HOPELESS: 0
SUM OF ALL RESPONSES TO PHQ QUESTIONS 1-9: 0
1. LITTLE INTEREST OR PLEASURE IN DOING THINGS: 0
SUM OF ALL RESPONSES TO PHQ9 QUESTIONS 1 & 2: 0
SUM OF ALL RESPONSES TO PHQ QUESTIONS 1-9: 0

## 2022-12-08 NOTE — PROGRESS NOTES
[facility]  Riddhi Hahn, [facility]  [address]  Dept: [phone]  Dept Fax: [phone]      Name: Annita Fontenot  : 1950         Chief Complaint:     Chief Complaint   Patient presents with    Hypertension     6 month check up. History of Present Illness:      Annita Fontenot is a 81 y.o.  female who presents with Hypertension (6 month check up. )    Scott Ozuna is here today for a 6 month check up. She was offered and educated on colonoscopy screening and refuses. [facility] is not taking the Atorvastatin and states she isn't going to take any more medications. She continues to have right knee pain and has been having injections. She states she continues to have daily pain in that right knee. Scott Ozuna voices that she doesn't want to be here and that she wanted a knee replacement and they would not approve her due to a lung nodule that was discovered on pre-op testing. When asked if she would like to further look into this to try again she just gets angry and repeats herself. Past Medical History:     Past Medical History:   Diagnosis Date    GERD (gastroesophageal reflux disease)     Hypertension       Reviewed all health maintenance requirements and ordered appropriate tests  Health Maintenance Due   Topic Date Due    Diabetes screen  Never done    DEXA (modify frequency per FRAX score)  Never done    Colorectal Cancer Screen  10/29/2020       Past Surgical History:     Past Surgical History:   Procedure Laterality Date    BREAST BIOPSY      TONSILLECTOMY      TUBAL LIGATION          Medications:       Prior to Admission medications    Medication Sig Start Date End Date Taking?  Authorizing Provider   hydroCHLOROthiazide (MICROZIDE) 12.5 MG capsule Take 1 capsule by mouth once daily in the morning 22  Yes Jonathan Walker APRN - CNP   amLODIPine (NORVASC) 5 MG tablet Take 1 tablet by mouth once daily 22  Yes Jonathan Walker APRN - CNP   latanoprost (XALATAN) 0.005 % ophthalmic solution  10/9/21  Yes Historical Provider, MD   acetaminophen (TYLENOL) 500 MG tablet Take 500 mg by mouth every 6 hours as needed   Yes Historical Provider, MD   levothyroxine (SYNTHROID) 75 MCG tablet Takes 5  days/week 11/22/19  Yes Historical Provider, MD   busPIRone (BUSPAR) 5 MG tablet 5 mg in the morning and at bedtime 8/31/22   Historical Provider, MD   atorvastatin (LIPITOR) 10 MG tablet Take 1 tablet by mouth daily  Patient not taking: Reported on 12/8/2022 12/5/22   CHRISTIANO Agarwal CNP        Allergies:       Patient has no known allergies. Social History:     Tobacco:    reports that she quit smoking about 30 years ago. Her smoking use included cigarettes. She has a 20.00 pack-year smoking history. She has never used smokeless tobacco.  Alcohol:      reports no history of alcohol use. Drug Use:  reports no history of drug use. Family History:     Family History   Problem Relation Age of Onset    High Blood Pressure Father     High Blood Pressure Sister     High Blood Pressure Other        Review of Systems:     Positive and Negative as described in HPI    Review of Systems   Constitutional: Negative. HENT: Negative. Eyes: Negative. Respiratory: Negative. Cardiovascular: Negative. Gastrointestinal: Negative. Endocrine: Negative. Genitourinary: Negative. Musculoskeletal:  Positive for arthralgias and myalgias. Skin: Negative. Allergic/Immunologic: Negative. Neurological: Negative. Hematological: Negative. Psychiatric/Behavioral:  Positive for dysphoric mood. Physical Exam:   Vitals:  /82   Pulse 86   Temp 98.2 °F (36.8 °C) (Temporal)   Resp 18   Wt 144 lb 3.2 oz (65.4 kg)   SpO2 98%   BMI 28.16 kg/m²     Physical Exam  Vitals and nursing note reviewed. Constitutional:       Appearance: Normal appearance. HENT:      Head: Normocephalic.       Right Ear: External ear normal. Left Ear: External ear normal.      Nose: Nose normal.      Mouth/Throat:      Mouth: Mucous membranes are moist.      Pharynx: Oropharynx is clear. Eyes:      Extraocular Movements: Extraocular movements intact. Pupils: Pupils are equal, round, and reactive to light. Cardiovascular:      Rate and Rhythm: Normal rate and regular rhythm. Heart sounds: Normal heart sounds. Pulmonary:      Effort: Pulmonary effort is normal.   Musculoskeletal:         General: Normal range of motion. Cervical back: Normal range of motion. Skin:     General: Skin is warm. Capillary Refill: Capillary refill takes less than 2 seconds. Neurological:      General: No focal deficit present. Mental Status: She is alert and oriented to person, place, and time. Psychiatric:         Attention and Perception: Attention normal.         Mood and Affect: Affect is flat and angry. Speech: Speech normal.         Behavior: Behavior is agitated. Thought Content:  Thought content normal.         Cognition and Memory: Cognition normal.       Data:     Lab Results   Component Value Date/Time     12/01/2022 08:00 AM    K 4.0 12/01/2022 08:00 AM    CL 99 12/01/2022 08:00 AM    CO2 31 12/01/2022 08:00 AM    BUN 14 12/01/2022 08:00 AM    CREATININE 0.57 12/01/2022 08:00 AM    GLUCOSE 106 12/01/2022 08:00 AM    PROT 8.1 10/29/2019 10:29 AM    LABALBU 4.5 10/29/2019 10:29 AM    BILITOT 0.47 10/29/2019 10:29 AM    ALKPHOS 72 10/29/2019 10:29 AM    AST 19 07/16/2020 10:00 AM    ALT 15 07/16/2020 10:00 AM     Lab Results   Component Value Date/Time    WBC 6.7 12/01/2022 08:00 AM    WBC 7.8 10/29/2019 10:29 AM    RBC 4.34 12/01/2022 08:00 AM    HGB 14.0 12/01/2022 08:00 AM    HCT 39.9 12/01/2022 08:00 AM    MCV 91.9 12/01/2022 08:00 AM    MCH 32.3 12/01/2022 08:00 AM    MCHC 35.1 12/01/2022 08:00 AM    RDW 11.8 12/01/2022 08:00 AM     12/01/2022 08:00 AM     10/29/2019 10:29 AM    MPV 9.9 12/01/2022 08:00 AM     Lab Results   Component Value Date/Time    TSH 0.08 07/16/2020 10:00 AM     Lab Results   Component Value Date/Time    CHOL 269 12/01/2022 08:00 AM    HDL 60 12/01/2022 08:00 AM       Assessment/Plan:      Diagnosis Orders   1. Essential hypertension        2. Hyperlipidemia, unspecified hyperlipidemia type        3. EFREN (generalized anxiety disorder)        4. Primary osteoarthritis of right knee        5. Positive FIT (fecal immunochemical test)            The 10-year ASCVD risk score (Chau ESCOTO, et al., 2019) is: 14.3%    Values used to calculate the score:      Age: 81 years      Sex: Female      Is Non- : No      Diabetic: No      Tobacco smoker: No      Systolic Blood Pressure: 065 mmHg      Is BP treated: Yes      HDL Cholesterol: 60 mg/dL      Total Cholesterol: 269 mg/dL     I have spent 20 minutes counseling Ender Baxter on her cardiac risk score and importance of taking her Atorvastatin that was prescribed. She continues to refuse. Time was also spent educating on Colonoscopy or other screenings available and she refuses. She had a previously positive FIT card and this was explained to her to follow up and she continues to refuse. Ender Baxter does not want to revisit surgery on knee when offered. She will continue to take the medications previously prescribed. 1.  Ender Baxter received counseling on the following healthy behaviors: nutrition, exercise, and medication adherence  2. Patient given educational materials - see patient instructions  3. Was a self-tracking handout given in paper form or via [facility]ana[facility]? No  If yes, see orders or list here. 4.  Discussed use, benefit, and side effects of prescribed medications. Barriers to medication compliance addressed. All patient questions answered. Pt voiced understanding. 5.  Reviewed prior labs and health maintenance  6. Continue current medications, diet and exercise.     Completed Refills   Requested Prescriptions

## 2022-12-08 NOTE — PATIENT INSTRUCTIONS
SURVEY:     You may be receiving a survey from [email or web address] regarding your visit today. Please complete the survey to enable us to provide the highest quality of care to you and your family. If you cannot score us a very good on any question, please call the office to discuss how we could have made your experience a very good one.      Thank you,    Jon Knight, CHRISTIANO-MEGA Sánchez, APRN-CNP  Alok Waldron, LENNIE Abdullahi, DINH Johnson, DINH Lorenz, CMA  Shawna, THAD Potts, PM

## 2022-12-09 ASSESSMENT — ENCOUNTER SYMPTOMS
GASTROINTESTINAL NEGATIVE: 1
EYES NEGATIVE: 1
RESPIRATORY NEGATIVE: 1
ALLERGIC/IMMUNOLOGIC NEGATIVE: 1

## 2022-12-19 RX ORDER — AMLODIPINE BESYLATE 5 MG/1
TABLET ORAL
Qty: 90 TABLET | Refills: 0 | Status: SHIPPED | OUTPATIENT
Start: 2022-12-19

## 2022-12-21 NOTE — PROGRESS NOTES
Subjective:      Patient ID: Bella Kearney is a 81 y.o. female. HPI  Patient here for right middle lobe nodule. Last seen in office per me in February 2022 and in September 2021 per Dr. Sanchez Grew imaging of her nodule with her. We will repeat CT scan in 1 year to document 2 years of radiographic stability. Medications:   No pulmonary meds     PRIOR WORKUP:  PFT:  None on chart     CT Imaging:    CT chest 1/11/2023: Right middle lobe nodule follow-up. No change in biapical scarring 2.1 mm left apical nodule. Negative for pleural effusion, consolidation or pneumothorax. Right middle lobe nodule measuring 8.6 x 3.5 mm previously was measuring 9.9 x 6.3 mm and 13.1 x 8.2 mm in August 2021. CT chest 2/7/2022: Multivessel coronary artery disease stable. Spiculated right middle lobe nodule with fairly extensions is identified again nodular element is measuring 9.9 x 6.3 versus 13.1 x 8.2 mm previously. Y-shaped extensions extending toward the pleural surface also appears slightly smaller. No additional nodule detected. Mild bronchial wall thickening. Pet Scan 9/29/2021: Recently described right middle lobe opacity is demonstrated with lobulated semisolid appearance. Minimal metabolic activity with an SUV max of 2.0. Possibility of inflammatory process should be considered along with neoplastic disease. CT chest 8/27/2021: No significant mediastinal or hilar adenopathy. Spiculated appearing nodule involving the right middle lobe measuring 1.7 x 1.1 cm. No airspace consolidation, pleural effusion or pneumothorax.      Sleep Study:  None on chart     Laboratory Evaluation:       Immunizations:   Immunization History   Administered Date(s) Administered    COVID-19, MODERNA BLUE border, Primary or Immunocompromised, (age 12y+), IM, 100 mcg/0.5mL 01/13/2021, 02/10/2021, 11/24/2021, 08/31/2022    Influenza A ([id number]) Vaccine PF IM 12/03/2009    Influenza Virus Vaccine 09/01/2019 Pneumococcal Polysaccharide ([id number]) 2017    Zoster Live (Zostavax) 2002        No flowsheet data found.     /81 (Site: Left Upper Arm, Position: Sitting, Cuff Size: Medium Adult)   Pulse 88   Temp 97.6 °F (36.4 °C) (Temporal)   Resp 16   Ht 5' (1.524 m)   Wt 140 lb 12.8 oz (63.9 kg)   SpO2 98%   BMI 27.50 kg/m²     Past Medical History:   Diagnosis Date    GERD (gastroesophageal reflux disease)     Hypertension      Past Surgical History:   Procedure Laterality Date    BREAST BIOPSY      TONSILLECTOMY      TUBAL LIGATION       Family History   Problem Relation Age of Onset    High Blood Pressure Father     High Blood Pressure Sister     High Blood Pressure Other        Social History     Socioeconomic History    Marital status:      Spouse name: Not on file    Number of children: Not on file    Years of education: Not on file    Highest education level: Not on file   Occupational History    Not on file   Tobacco Use    Smoking status: Former     Packs/day: 1.00     Years: 20.00     Pack years: 20.00     Types: Cigarettes     Quit date: 12     Years since quittin.0    Smokeless tobacco: Never   Vaping Use    Vaping Use: Never used   Substance and Sexual Activity    Alcohol use: No    Drug use: No    Sexual activity: Yes   Other Topics Concern    Not on file   Social History Narrative    Not on file     Social Determinants of Health     Financial Resource Strain: Low Risk     Difficulty of Paying Living Expenses: Not hard at all   Food Insecurity: No Food Insecurity    Worried About Running Out of Food in the Last Year: Never true    Ran Out of Food in the Last Year: Never true   Transportation Needs: Not on file   Physical Activity: Sufficiently Active    Days of Exercise per Week: 3 days    Minutes of Exercise per Session: 60 min   Stress: Not on file   Social Connections: Not on file   Intimate Partner Violence: Not on file   Housing Stability: Not on file       Review of Systems   Constitutional:         Decreased activity tolerance secondary to exertional dyspnea. HENT:          Denies sinus pain/pressure. Denies rhinorrhea. Occasional cough   Eyes: Negative. Respiratory:          Exertional dyspnea. No significant cough. Denies sputum production-specifically purulent or hemoptysis   Cardiovascular: Negative. Gastrointestinal: Negative. Endocrine: Negative. Genitourinary: Negative. Musculoskeletal: Negative. Skin: Negative. Allergic/Immunologic: Negative. Neurological: Negative. Hematological: Negative. Psychiatric/Behavioral: Negative.        Objective:       Physical Exam  General appearance - alert, well appearing, and in no distress  Mental status - alert, oriented to person, place, and time  Eyes - pupils equal and reactive, extraocular eye movements intact  Ears - not examined  Nose - normal and patent, no erythema, discharge or polyps  Mouth - mucous membranes moist, pharynx normal without lesions  Neck - supple, no significant adenopathy  Chest - clear to auscultation, no wheezes, rales or rhonchi, symmetric air entry  Heart -normal rate, regular rhythm, normal S1, S2, no murmurs, rubs, clicks or gallops  Abdomen - soft, nontender, nondistended, no masses or organomegaly  Neuro- alert, oriented, normal speech, no focal findings or movement disorder noted}  Extremities - peripheral pulses normal, no pedal edema, no clubbing or cyanosis  Skin - normal coloration and turgor, no rashes, no suspicious skin lesions noted     Wt Readings from Last 3 Encounters:   01/18/23 140 lb 12.8 oz (63.9 kg)   12/08/22 144 lb 3.2 oz (65.4 kg)   06/08/22 141 lb 6.4 oz (64.1 kg)       Results for orders placed or performed in visit on 50/24/02   Basic Metabolic Panel   Result Value Ref Range    Glucose 106 (H) 70 - 99 mg/dL    BUN 14 8 - 23 mg/dL    Creatinine 0.57 (L) 0.60 - 1.30 mg/dL    EGFR IF NonAfrican American 98 >60 mL/min/1.73    Calcium 9.6 8.5 - 10.5 mg/dL    Sodium 140 133 - 146 meq/L    Potassium 4.0 3.5 - 5.4 meq/L    Chloride 99 95 - 107 meq/L    CO2 31 19 - 31 meq/L    Anion Gap 10.0 7.0 - 16.0 meq/L   CBC WITHOUT DIFFERENTIAL   Result Value Ref Range    WBC 6.7 3.5 - 11.0 K/uL    RBC 4.34 3.80 - 5.40 M/uL    Hemoglobin 14.0 11.5 - 15.5 g/dL    Hematocrit 39.9 34.0 - 45.0 %    MCV 91.9 80.0 - 99.0 fL    MCH 32.3 25.0 - 33.0 pg    MCHC 35.1 31.0 - 36.0 g/dL    RDW 11.8 11.5 - 15.0 %    Platelets 915 970 - 467 K/uL    MPV 9.9 9.3 - 13.0 fL   Lipid Panel   Result Value Ref Range    Cholesterol 269 (H) <200 mg/dL    Triglycerides 150 (H) <150 mg/dL    HDL 60 >39 mg/dL    LDL Calculated 179 (H) <100 mg/dL    VLDL Cholesterol Calculated 30 (H) <30 mg/dL    LDL/HDL Ratio 3.0 <3.22 RATIO    Chol/HDL Ratio 4.5 (H) <4.44 RATIO       No results found. Assessment:      1. Lung nodule    2. Incidental lung nodule          Plan:      No pulmonary medications. Recommend flu vaccination in the fall annually. Patient is up-to-date with pneumococcal vaccinations from pulmonary perspective. Patient has received Covid vaccination recommended Bivalent booster. Discussed strategies to protect against Covid 19. Maintain an active lifestyle. Patient's questions were answered to her satisfaction. Former smoker quit 1990 with a pack year history of 20  CT scan of the chest was reviewed along with PET scan. Repeat CT scan in January 2024 to document 2 years of radiographic stability.   We'll see the patient back in 6 months after CT scan          Electronically signed by CHRISTIANO Valdez CNP on 1/18/2023 at 12:53 PM

## 2023-01-11 ENCOUNTER — HOSPITAL ENCOUNTER (OUTPATIENT)
Dept: CT IMAGING | Age: 73
Discharge: HOME | End: 2023-01-13
Payer: MEDICARE

## 2023-01-11 DIAGNOSIS — R91.1 NODULE OF RIGHT LUNG: ICD-10-CM

## 2023-01-11 PROCEDURE — 71250 CT THORAX DX C-: CPT

## 2023-01-16 DIAGNOSIS — R91.1 LUNG NODULE: Primary | ICD-10-CM

## 2023-01-18 ENCOUNTER — OFFICE VISIT (OUTPATIENT)
Dept: PULMONOLOGY | Age: 73
End: 2023-01-18
Payer: MEDICARE

## 2023-01-18 VITALS
SYSTOLIC BLOOD PRESSURE: 128 MMHG | RESPIRATION RATE: 16 BRPM | WEIGHT: 140.8 LBS | BODY MASS INDEX: 27.64 KG/M2 | HEART RATE: 88 BPM | OXYGEN SATURATION: 98 % | DIASTOLIC BLOOD PRESSURE: 81 MMHG | HEIGHT: 60 IN | TEMPERATURE: 97.6 F

## 2023-01-18 DIAGNOSIS — R91.1 LUNG NODULE: Primary | ICD-10-CM

## 2023-01-18 DIAGNOSIS — R91.1 INCIDENTAL LUNG NODULE: ICD-10-CM

## 2023-01-18 PROCEDURE — 1123F ACP DISCUSS/DSCN MKR DOCD: CPT | Performed by: NURSE PRACTITIONER

## 2023-01-18 PROCEDURE — 99213 OFFICE O/P EST LOW 20 MIN: CPT | Performed by: NURSE PRACTITIONER

## 2023-01-18 PROCEDURE — 3079F DIAST BP 80-89 MM HG: CPT | Performed by: NURSE PRACTITIONER

## 2023-01-18 PROCEDURE — 3074F SYST BP LT 130 MM HG: CPT | Performed by: NURSE PRACTITIONER

## 2023-01-18 ASSESSMENT — ENCOUNTER SYMPTOMS
GASTROINTESTINAL NEGATIVE: 1
ALLERGIC/IMMUNOLOGIC NEGATIVE: 1
EYES NEGATIVE: 1

## 2023-01-18 NOTE — PATIENT INSTRUCTIONS
SURVEY:    You may be receiving a survey from [employer] regarding your visit today. Please complete the survey to enable us to provide the highest quality of care to you and your family. If you cannot score us a very good on any question, please call the office to discuss how we could have made your experience a very good one. Thank you.

## 2023-01-27 ENCOUNTER — TELEPHONE (OUTPATIENT)
Dept: PRIMARY CARE CLINIC | Age: 73
End: 2023-01-27

## 2023-01-27 NOTE — TELEPHONE ENCOUNTER
Attmepted to contact patient in regards to being due for colon cancer screening. Patient did not answer and no voicemail.

## 2023-03-14 NOTE — TELEPHONE ENCOUNTER
Health Maintenance   Topic Date Due    DEXA (modify frequency per FRAX score)  Never done    Colorectal Cancer Screen  10/29/2020    DTaP/Tdap/Td vaccine (1 - Tdap) 06/08/2023 (Originally 2/7/1969)    Shingles vaccine (2 of 3) 06/08/2023 (Originally 4/4/2002)    Hepatitis C screen  06/08/2023 (Originally [birth year]1970)    Flu vaccine (1) 12/08/2023 (Originally 8/1/2022)    COVID-19 Vaccine (5 - Booster for Wadie Ware series) 12/08/2023 (Originally 10/26/2022)    Annual Wellness Visit (AWV)  06/09/2023    Lipids  12/01/2023    Depression Screen  12/08/2023    Breast cancer screen  06/23/2024    Pneumococcal 65+ years Vaccine  Completed    Hepatitis A vaccine  Aged Out    Hib vaccine  Aged Out    Meningococcal (ACWY) vaccine  Aged Out             (applicable per patient's age: Cancer Screenings, Depression Screening, Fall Risk Screening, Immunizations)    LDL Cholesterol (mg/dL)   Date Value   10/29/2019 116     LDL Calculated (mg/dL)   Date Value   12/01/2022 179 (H)     AST (U/L)   Date Value   07/16/2020 19     ALT (U/L)   Date Value   07/16/2020 15     BUN (mg/dL)   Date Value   12/01/2022 14      (goal A1C is < 7)   (goal LDL is <100) need 30-50% reduction from baseline     BP Readings from Last 3 Encounters:   01/18/23 128/81   12/08/22 132/82   06/08/22 (!) 146/82    (goal /80)      All Future Testing planned in [facility]:  Lab Frequency Next Occurrence   Lipid Panel Once 06/08/2022   CBC Once 47/79/1938   Basic Metabolic Panel Once 82/69/2313   CT CHEST LOW DOSE (LDCT) Once 01/11/2024       Next Visit Date:  Future Appointments   Date Time Provider Kassandra Louis   6/12/2023  8:00 AM CHRISTIANO Agarwal - CNP [facility]   2/5/2024  9:30 AM [facility]            Patient Active Problem List:     Multinodular goiter     Hypertriglyceridemia     Chondromalacia of knee     Elevated blood pressure reading     Essential hypertension     Dyslipidemia     Vitamin D deficiency Anxiety     Positive FIT (fecal immunochemical test)     Congenital hypothyroidism without goiter     Nodule of right lung

## 2023-03-15 RX ORDER — AMLODIPINE BESYLATE 5 MG/1
TABLET ORAL
Qty: 90 TABLET | Refills: 1 | Status: SHIPPED | OUTPATIENT
Start: 2023-03-15

## 2023-04-18 ENCOUNTER — TELEPHONE (OUTPATIENT)
Dept: PULMONOLOGY | Age: 73
End: 2023-04-18

## 2023-04-18 NOTE — TELEPHONE ENCOUNTER
Patient is needing a pulmonary clearance for a RTK. Last seen by you on 1/23 . Dr. Claude Requena is the orthopedic surgeon. Planned surgery on 5/2/2023. Please advise.

## 2023-05-23 ENCOUNTER — HOSPITAL ENCOUNTER (OUTPATIENT)
Dept: PHYSICAL THERAPY | Age: 73
Setting detail: THERAPIES SERIES
Discharge: HOME | End: 2023-05-23
Payer: MEDICARE

## 2023-05-23 PROCEDURE — 97110 THERAPEUTIC EXERCISES: CPT

## 2023-05-23 PROCEDURE — G0283 ELEC STIM OTHER THAN WOUND: HCPCS

## 2023-05-23 PROCEDURE — 97161 PT EVAL LOW COMPLEX 20 MIN: CPT

## 2023-05-23 NOTE — PLAN OF CARE
[facility]           Phone: [phone]             Outpatient Physical Therapy  Fax: [phone]                                           Date: 2023  Patient: Demi Davis : 1950 CSN #: [id number]   Referring Physician: Hwaa Piedra DO      [x] Plan of Care   [] Updated Plan of Care    Dates of Service to Include: 2023 to 23    Diagnosis:  Z47.1 - aftercare following joint replacement surgery / Z96.651 - presence of right articial knee joint    Rehab (Treatment) Diagnosis:  right knee pain, difficulty in ambulation             Onset Date:  23    Attendance  Total # of Visits to Date: 1 No Show: 0 Canceled Appointment: 0    Assessment  Body Structures, Functions, Activity Limitations Requiring Skilled Therapeutic Intervention: Decreased functional mobility , Decreased ROM, Decreased strength, Decreased endurance, Increased pain, Decreased balance, Decreased high-level IADLs  Assessment: Pt is a 72year old female being evaluated for skilled outpatient physical therapy status post right total knee replacement. Pt currently ambulating with wide base quad cane for balance support. Pt demonstrating reduction in right knee mobility compared to the left and reduced strength right compared to left lower extremity. Pt very motivated to participate in therapy. Pt to benefit from skilled outpatient physical therapy services in order to facilitate pain management, left knee range of motion, strength, and independence in ambulation in order to return to prior level of function.     Objective Measures:    AROM LLE (degrees)  L Knee Flexion (0-145): 130  L Knee Extension (0): 0     AROM RLE   R knee flexion: 89  R knee extension: lacking 8    Strength RLE  R Hip Flexion: 4/5  R Knee Flexion: 4/5  R Knee Extension: 4-/5  R Ankle Dorsiflexion: 5/5     Strength LLE  L Hip Flexion: 4+/5  L Knee Flexion:

## 2023-05-23 NOTE — PROGRESS NOTES
Phone: [address]          Fax: [phone]                      Outpatient Physical Therapy                                                                      Evaluation  Date: 2023  Patient: Josi Cardenas  : 1950  CSN #: [id number]    Referring Physician: Denny Oconnell DO    Medical Diagnosis: Z47.1 - aftercare following joint replacement surgery / C98.643 - presence of right articial knee joint    Treatment Diagnosis: right knee pain, difficulty in ambulation  Onset Date: 23  PT Insurance Information: [employer] Medicare  Total # of Visits Approved: 10   Total # of Visits to Date: 1  No Show: 0  Canceled Appointment: 0     Subjective  Subjective: Pt arrives status post right TKA 2023. Pt completed home health that was completed on 2023. Pt states she was doing really good but she hit a block and she feels that she cannot bend as far as she should. Pt currently ambulating with a quad cane for community ambulation. Pt states she uses her walker occasionally as needed. Pt reports the knee mostly aches and complains that the pain medication doesnt help as much as she would like. Pt currently using pain medication and ice for pain management. Additional Pertinent Hx: High blood pressure, vision problems, arthritis,    Observations:   General Observations  General Observations: Yes  Description: Pt with well healing incision, some steri-stripes remain.  No obvious red flags for infections or mal-healing       Ambulation/Gait (if applicable):   Ambulation  WB Status: WBAT  Ambulation  Surface: Level tile  Device: AUM Cardiovascular  Quality of Gait: antalgic gait on right LE       Objective    AROM       AROM LLE (degrees)  L Knee Flexion (0-145): 130  L Knee Extension (0): 0    AROM RLE   R knee flexion: 89  R knee extension: lacking 8       Strength    Strength RLE  R Hip Flexion: 4/5  R Knee Flexion: 4/5  R Knee Extension: 4-/5  R Ankle

## 2023-05-26 ENCOUNTER — HOSPITAL ENCOUNTER (OUTPATIENT)
Dept: PHYSICAL THERAPY | Age: 73
Setting detail: THERAPIES SERIES
Discharge: HOME | End: 2023-05-26
Payer: MEDICARE

## 2023-05-26 PROCEDURE — 97140 MANUAL THERAPY 1/> REGIONS: CPT

## 2023-05-26 PROCEDURE — 97110 THERAPEUTIC EXERCISES: CPT

## 2023-05-26 PROCEDURE — G0283 ELEC STIM OTHER THAN WOUND: HCPCS

## 2023-05-26 NOTE — PROGRESS NOTES
Phone: Lauren           Fax: [phone]                           Outpatient Physical Therapy                                                                            Daily Note    Patient: Sunshine Arenas : 1950  CSN #: [id number]   Referring Physician: Florencio Aguero DO  Date: 2023       Treatment Diagnosis: right knee pain, difficulty in ambulation    Onset Date: 23  PT Insurance Information: [employer] Medicare  Total # of Visits Approved: 10 Per Physician Order  Total # of Visits to Date: 2  No Show: 0  Canceled Appointment: 0    23 Plan of Care/Recert Due    Pre-Treatment Pain:  3-4/10  Subjective: Pt states she is doing well this date, is not happy with how L knee is doing at this time. Exercises:  Exercise 1: HEP: quad set, heel slide, long arch quad, supine knee extension on heel prop, ankle pump  Exercise 2: GS Slantboard STretching 3x30\"  Exercise 3: Knee flexion/extension stretching on step x10 each  Exercise 4: Knee flexion with slider x10  Exercise 5: Step ups F/L 6\" x10 each    Manual:  Joint Mobilization: R Knee DIstraction sitting EoB    Modalities:   15' IFC to R Knee to decrease inflammation and pain    Assessment  Body Structures, Functions, Activity Limitations Requiring Skilled Therapeutic Intervention: Decreased functional mobility , Decreased ROM, Decreased strength, Decreased endurance, Increased pain, Decreased balance, Decreased high-level IADLs  Assessment: Pt did well with all stretching and strengthening exercises this date. Pt demonstrated appx 102 degrees R knee flexion sitting with knee slider, and lacking only 5 degrees of extension as well. Pt notes mild relief from Estim. Plan to continue to progress pt as tolerated.     Activity Tolerance  Activity Tolerance: Patient tolerated treatment well    Patient Education  Patient Education: HEP and POC  Pt verbalized/demonstrated good understanding:     [x] Yes

## 2023-05-30 ENCOUNTER — HOSPITAL ENCOUNTER (OUTPATIENT)
Dept: PHYSICAL THERAPY | Age: 73
Setting detail: THERAPIES SERIES
Discharge: HOME | End: 2023-05-30
Payer: MEDICARE

## 2023-05-30 PROCEDURE — 97110 THERAPEUTIC EXERCISES: CPT

## 2023-05-30 PROCEDURE — G0283 ELEC STIM OTHER THAN WOUND: HCPCS

## 2023-05-30 NOTE — PROGRESS NOTES
Phone: Lauren           Fax: [phone]                           Outpatient Physical Therapy                                                                            Daily Note    Patient: Galina Woodard : 1950  CSN #: [id number]   Referring Physician: Ijeoma Tsai DO  Date: 2023       Treatment Diagnosis: right knee pain, difficulty in ambulation    Onset Date: 23  PT Insurance Information: [employer]na Medicare  Total # of Visits Approved: 10 Per Physician Order  Total # of Visits to Date: 3  No Show: 0  Canceled Appointment: 0    23 Plan of Care/Recert Due    Pre-Treatment Pain:  3/10  Subjective: Pt reports minimal pain 3/10 \"I took my pain medication before coming\". Pt states the antibiotics she is taking are helping reduce some tightness. Exercises:  Exercise 1: HEP: quad set, heel slide, long arch quad, supine knee extension on heel prop, ankle pump  Exercise 2: GS Slantboard Stretching 3x30\"  Exercise 3: Knee flexion/extension stretching on step x10 each  Exercise 5: Step ups F/L 6\" x10 each  Exercise 6: SciFit 8'  Exercise 7: standing marches/heel raises/3 way hip/butt kicks/mini squats x20  Exercise 8: supine hip abd/add x15    Manual:  Other: manual R knee flexion stretch; R knee extension with overpressure    Modalities:   IFC and CP applied to R knee in supine for 15min for pain and edema. Assessment  Assessment: Pt progressing well with added strengthening exercises. Pt continues to present with edema limiting knee flexion. Manual knee flexion and extension stretching provided with min overpressure. Concluded treatment with IFC and CP with relief noted. Activity Tolerance  Activity Tolerance: Patient tolerated treatment well    Patient Education  Patient Education: HEP  Pt verbalized/demonstrated good understanding:     [x] Yes         [] No, pt required further clarification.        Post Treatment Pain:

## 2023-05-31 ENCOUNTER — APPOINTMENT (OUTPATIENT)
Dept: PHYSICAL THERAPY | Age: 73
End: 2023-05-31
Payer: MEDICARE

## 2023-06-02 ENCOUNTER — HOSPITAL ENCOUNTER (OUTPATIENT)
Dept: PHYSICAL THERAPY | Age: 73
Setting detail: THERAPIES SERIES
Discharge: HOME | End: 2023-06-02
Payer: MEDICARE

## 2023-06-02 PROCEDURE — 97110 THERAPEUTIC EXERCISES: CPT

## 2023-06-02 PROCEDURE — G0283 ELEC STIM OTHER THAN WOUND: HCPCS

## 2023-06-02 NOTE — PROGRESS NOTES
Phone: Lauren           Fax: [phone]                           Outpatient Physical Therapy                                                                            Daily Note    Patient: Kaycee Arenas : 1950  CSN #: [id number]   Referring Physician: Rosie Petty DO  Date: 2023       Treatment Diagnosis: right knee pain, difficulty in ambulation    Onset Date: 23  PT Insurance Information: [employer] Medicare  Total # of Visits Approved: 10 Per Physician Order  Total # of Visits to Date: 4  No Show: 0  Canceled Appointment: 0    23 Plan of Care/Recert Due    Pre-Treatment Pain:  1/10  Subjective: Pt reports feeling good today only minor ache in R knee and min pain above patella. Exercises:  Exercise 1: HEP: quad set, heel slide, long arch quad, supine knee extension on heel prop, ankle pump  Exercise 2: GS Slantboard Stretching 3x30\"  Exercise 3: Knee flexion/extension stretching on step x10 each  Exercise 5: Step ups F/L 6\" x15 each  Exercise 6: SciFit 10' lvl 2  Exercise 7: sink ex x20  Exercise 8: supine hip abd/add x15    Manual:  Joint Mobilization: R Knee DIstraction sitting EoB  Other: manual R knee flexion stretch; R knee extension with overpressure    Modalities:   IFC with CP to R knee in supine for 15min to reduce edema and pain. Assessment  Assessment: Patient's AROM 95 degrees flexion this date. Pt completed charted ex with no increase in pain or discomfort. Manual knee flexion and extension stretching provided with min overpressure. Continue to progress per tolerance. Activity Tolerance  Activity Tolerance: Patient tolerated treatment well    Patient Education  Patient Education: HEP  Pt verbalized/demonstrated good understanding:     [x] Yes         [] No, pt required further clarification.        Post Treatment Pain:  1/10      Plan  Plan Frequency: 2x per week  Plan weeks: 4 weeks       Goals  (Total # of Visits to

## 2023-06-06 ENCOUNTER — HOSPITAL ENCOUNTER (OUTPATIENT)
Dept: PHYSICAL THERAPY | Age: 73
Setting detail: THERAPIES SERIES
Discharge: HOME | End: 2023-06-06
Payer: MEDICARE

## 2023-06-06 PROCEDURE — 97140 MANUAL THERAPY 1/> REGIONS: CPT

## 2023-06-06 PROCEDURE — G0283 ELEC STIM OTHER THAN WOUND: HCPCS

## 2023-06-06 PROCEDURE — 97110 THERAPEUTIC EXERCISES: CPT

## 2023-06-06 NOTE — PROGRESS NOTES
weeks       Goals  (Total # of Visits to Date: 5)      Short Term Goals  Time Frame for Short Term Goals: 3 weeks  Short Term Goal 1: Pt will be educated on and initate HEP for mobility and quad firing  Short Term Goal 2: Pt will demonstrate right knee flexion to greater than 95 degrees in order to improve transfers  Short Term Goal 3: Pt will tolerate 30 minutes of standing activity in order improve ability to tolerate household chores    Long Term Goals  Time Frame for Long Term Goals : 4 weeks  Long Term Goal 1: Pt will be independent and compliant with advanced HEP in order to self manage symptoms upon discharge  Long Term Goal 2: Pt will improve right knee flexion to at least 115 degrees in order to improve ability to ambulate up and down steps  Long Term Goal 3: Pt will successfully wean from quad cane in order to improve independence with ambulation  Long Term Goal 4: Pt will report 70% improvement in function in order to improve quality of life  Long Term Goal 5: Pt will perform straight leg raise with 0 degrees of quad lag in order to facilitate quad strength as needed for independent ambulation    Minutes Tracking:  Time In: 1415  Time Out: 1516  Minutes: 61  Timed Code Treatment Minutes: 58 Minutes        Cece Schrader     Date: 6/6/2023

## 2023-06-08 ENCOUNTER — HOSPITAL ENCOUNTER (OUTPATIENT)
Dept: PHYSICAL THERAPY | Age: 73
Setting detail: THERAPIES SERIES
Discharge: HOME | End: 2023-06-08
Payer: MEDICARE

## 2023-06-08 PROCEDURE — 97110 THERAPEUTIC EXERCISES: CPT

## 2023-06-08 PROCEDURE — G0283 ELEC STIM OTHER THAN WOUND: HCPCS

## 2023-06-08 NOTE — PROGRESS NOTES
looks well healed, closed, with no obvious red flags for infection. Pt to progress per tolerance. Ended treatment with IFC estim and cold pack for pain managment. Activity Tolerance  Activity Tolerance: Patient tolerated treatment well    Patient Education  Patient Education: aquatic therapy  Pt verbalized/demonstrated good understanding:     [x] Yes         [] No, pt required further clarification.        Post Treatment Pain:  4/10      Plan  Plan Frequency: 2x per week  Plan weeks: 4 weeks       Goals  (Total # of Visits to Date: 6)      Short Term Goals  Time Frame for Short Term Goals: 3 weeks  Short Term Goal 1: Pt will be educated on and initate HEP for mobility and quad firing - met  Short Term Goal 2: Pt will demonstrate right knee flexion to greater than 95 degrees in order to improve transfers  Short Term Goal 3: Pt will tolerate 30 minutes of standing activity in order improve ability to tolerate household chores    Long Term Goals  Time Frame for Long Term Goals : 4 weeks  Long Term Goal 1: Pt will be independent and compliant with advanced HEP in order to self manage symptoms upon discharge  Long Term Goal 2: Pt will improve right knee flexion to at least 115 degrees in order to improve ability to ambulate up and down steps  Long Term Goal 3: Pt will successfully wean from quad cane in order to improve independence with ambulation - met  Long Term Goal 4: Pt will report 70% improvement in function in order to improve quality of life  Long Term Goal 5: Pt will perform straight leg raise with 0 degrees of quad lag in order to facilitate quad strength as needed for independent ambulation    Minutes Tracking:  Time In: 1530  Time Out: 1618  Minutes: 48  Timed Code Treatment Minutes: 1500 [facility] Esequiel Farooq [id number]     Date: 6/8/2023

## 2023-06-19 ENCOUNTER — HOSPITAL ENCOUNTER (OUTPATIENT)
Dept: PHYSICAL THERAPY | Age: 73
Setting detail: THERAPIES SERIES
Discharge: HOME | End: 2023-06-19
Payer: MEDICARE

## 2023-06-19 PROCEDURE — 97110 THERAPEUTIC EXERCISES: CPT

## 2023-06-19 PROCEDURE — 97140 MANUAL THERAPY 1/> REGIONS: CPT

## 2023-06-19 PROCEDURE — G0283 ELEC STIM OTHER THAN WOUND: HCPCS

## 2023-06-19 NOTE — PROGRESS NOTES
Phone: Lauren           Fax: [phone]                           Outpatient Physical Therapy                                                                            Daily Note    Patient: Kya Nguyen : 1950  CSN #: [id number]   Referring Physician: Kamini Chow DO  Date: 2023    Diagnosis: Z47.1 - aftercare following joint replacement surgery / Z96.651 - presence of right articial knee joint  Treatment Diagnosis: right knee pain, difficulty in ambulation    Onset Date: 23  PT Insurance Information: [employer] Medicare  Total # of Visits Approved: 10 Per Physician Order  Total # of Visits to Date: 9  No Show: 0  Canceled Appointment: 0    23 Plan of Care/Recert Due    Pre-Treatment Pain:  1/10  Subjective: Pt states she enjoys aquatic exercise. States pain is /10. States  she took a pain pill prior to session    Exercises:  Exercise 2: GS Slantboard Stretching 3x30\"  Exercise 3: Knee flexion/extension stretching on step x30 sec each  Exercise 5: FSU/SSU--6\" 10-15x,  SD 6\" 10-15x  Exercise 6: SciFit 10' lvl 3.5  Exercise 7: standing march, abduction, knee flexion, heel raises x20 each  Exercise 9: heel prop extension 5 minutes with quad sets and ankle pumps  Exercise 12: Sit to stands 2x15    Manual:  Joint Mobilization: Mobs for extension  Other: Prone stetching for flexion    Modalities:   IFC/cp for pain and swelling     Assessment  Assessment: Swelling is mild pain slightl increased during session from exercise and stretching. Ptone knee flexion 105 -108 deg. Extension -2 active. Progressed step downs to 6\" step and added star trac extension and flexion.  IFC/cp following manual therapy for pain and swelling    Activity Tolerance  Activity Tolerance: Patient tolerated treatment well    Patient Education  Patient Education: Progression of exercise  Pt verbalized/demonstrated good understanding:     [x] Yes         [] No, pt required further

## 2023-06-20 ENCOUNTER — APPOINTMENT (OUTPATIENT)
Dept: PHYSICAL THERAPY | Age: 73
End: 2023-06-20
Payer: MEDICARE

## 2023-06-22 ENCOUNTER — HOSPITAL ENCOUNTER (OUTPATIENT)
Dept: PHYSICAL THERAPY | Age: 73
Setting detail: THERAPIES SERIES
Discharge: HOME | End: 2023-06-22
Payer: MEDICARE

## 2023-06-22 PROCEDURE — G0283 ELEC STIM OTHER THAN WOUND: HCPCS

## 2023-06-22 PROCEDURE — 97110 THERAPEUTIC EXERCISES: CPT

## 2023-06-22 NOTE — PROGRESS NOTES
Phone: Lauren           Fax: [phone]                           Outpatient Physical Therapy                                                                            Daily Note    Patient: Stacey Murillo : 1950  CSN #: [id number]   Referring Physician: Shawn Bess DO  Date: 2023       Treatment Diagnosis: right knee pain, difficulty in ambulation    Onset Date: 23  PT Insurance Information: [employer] Medicare  Total # of Visits Approved: 10 Per Physician Order  Total # of Visits to Date: 10  No Show: 0  Canceled Appointment: 0    23 Plan of Care/Recert Due    Pre-Treatment Pain:  2/10  Subjective: Pt reports knee is feeling really good today and she did not have to take tylenol. Pain 2/10. Exercises:  Exercise 1: HEP: quad set, heel slide, long arch quad, supine knee extension on heel prop, ankle pump  Exercise 2: GS Slantboard Stretching 3x30\"  Exercise 3: Knee flexion/extension stretching on step x30 sec each  Exercise 4: knee extension with PTB 2 x 15  Exercise 5: FSU/SSU--6\" 10-15x,  SD 6\" 10-15x; heel taps 2 x 15  Exercise 6: SciFit 10' lvl 4.0  Exercise 7: standing march, abduction, knee flexion, heel raises x20 each on foam  Exercise 9: heel prop extension 5 minutes with quad sets and ankle pumps  Exercise 10: retro walking at counter 5 laps  Exercise 12: Sit to stands 2x15    Manual:  Joint Mobilization: Mobs for extension    Modalities:   IFC and CP applied to R knee in supine for 15min to reduce edema and pain. Assessment  Body Structures, Functions, Activity Limitations Requiring Skilled Therapeutic Intervention: Decreased functional mobility , Decreased ROM, Decreased strength, Decreased endurance, Increased pain, Decreased balance, Decreased high-level IADLs  Assessment: Progress pt with strengthening ex this date with no increase in pain or discomfort. IFC and CP applied post treatment session to decrease edema and pain.

## 2023-06-22 NOTE — PLAN OF CARE
[facility]           Phone: [phone]             Outpatient Physical Therapy  Fax: [phone]                                           Date: 2023  Patient: Murphy Taylor : 1950 CSN #: [id number]   Referring Physician: Kika Mariee DO      [] Plan of Care   [x] Updated Plan of Care    Dates of Service to Include: 2023 to 23    Diagnosis:  Z47.1 - aftercare following joint replacement surgery / Z96.651 - presence of right articial knee joint    Rehab (Treatment) Diagnosis:  right knee pain, difficulty in ambulation             Onset Date:  23    Attendance  Total # of Visits to Date: 9 No Show: 0 Canceled Appointment: 0    Assessment  Assessment: Swelling is mild pain slightl increased during session from exercise and stretching. Ptone knee flexion 105 -108 deg. Extension -2 active. Progressed step downs to 6\" step and added star trac extension and flexion. IFC/cp following manual therapy for pain and swelling. Pt is making good progress towards goals. Incision is well healed, no red flags for infection. Will continue skilled outpatient physical therapy 2x per week for 4 additional weeks in order to progress towards long term goals as needed for return to prior level of function.        Goals  Short Term Goals  Time Frame for Short Term Goals: 3 weeks  Short Term Goal 1: Pt will be educated on and initate HEP for mobility and quad firing - met  Short Term Goal 2: Pt will demonstrate right knee flexion to greater than 95 degrees in order to improve transfers -MET  Short Term Goal 3: Pt will tolerate 30 minutes of standing activity in order improve ability to tolerate household chores -MET  Long Term Goals  Time Frame for Long Term Goals : 4 weeks  Long Term Goal 1: Pt will be independent and compliant with advanced HEP in order to self manage symptoms upon discharge  Long Term Goal 2:

## 2023-06-27 ENCOUNTER — HOSPITAL ENCOUNTER (OUTPATIENT)
Dept: PHYSICAL THERAPY | Age: 73
Setting detail: THERAPIES SERIES
Discharge: HOME | End: 2023-06-27
Payer: MEDICARE

## 2023-06-27 PROCEDURE — G0283 ELEC STIM OTHER THAN WOUND: HCPCS

## 2023-06-27 PROCEDURE — 97110 THERAPEUTIC EXERCISES: CPT

## 2023-06-28 ENCOUNTER — HOSPITAL ENCOUNTER (OUTPATIENT)
Dept: PHYSICAL THERAPY | Age: 73
Setting detail: THERAPIES SERIES
Discharge: HOME | End: 2023-06-28
Payer: MEDICARE

## 2023-06-28 PROCEDURE — G0283 ELEC STIM OTHER THAN WOUND: HCPCS

## 2023-06-28 PROCEDURE — 97110 THERAPEUTIC EXERCISES: CPT

## 2023-06-28 PROCEDURE — 97140 MANUAL THERAPY 1/> REGIONS: CPT

## 2023-06-29 ENCOUNTER — APPOINTMENT (OUTPATIENT)
Dept: PHYSICAL THERAPY | Age: 73
End: 2023-06-29
Payer: MEDICARE

## 2023-07-06 ENCOUNTER — HOSPITAL ENCOUNTER (OUTPATIENT)
Dept: PHYSICAL THERAPY | Age: 73
Setting detail: THERAPIES SERIES
Discharge: HOME | End: 2023-07-06
Payer: MEDICARE

## 2023-07-06 PROCEDURE — G0283 ELEC STIM OTHER THAN WOUND: HCPCS

## 2023-07-06 PROCEDURE — 97110 THERAPEUTIC EXERCISES: CPT

## 2023-07-06 NOTE — PROGRESS NOTES
Phone: [address]           Fax: [phone]                           Outpatient Physical Therapy                                                                            Daily Note    Patient: Ofelia Mcrae : 1950  CSN #: [id number]   Referring Physician: Hever Braxton DO  Date: 2023    Diagnosis: Z47.1 - aftercare following joint replacement surgery / Z96.651 - presence of right articial knee joint  Treatment Diagnosis: right knee pain, difficulty in ambulation    Onset Date: 23  PT Insurance Information: [employer] Medicare  Total # of Visits Approved: 19 Per Physician Order  Total # of Visits to Date: 13  No Show: 0  Canceled Appointment: 0    23 Plan of Care/Recert Due    Pre-Treatment Pain:  2-3/10  Subjective: Pt arrives with reports of 2-3/10 pain this date. Pt states her left LE is now more painful than her right. Pt reports she is able to go all the way around on the bikes at [employer]    Exercises:  Exercise 2: GS Slantboard Stretching 3x30\"  Exercise 4: terminal knee extension with PTB 2 x 15  Exercise 5: FSU/SSU--8\" 10-15x,  SD 6\" 10-15x; Exercise 6: SciFit 10' lvl 4.0  Exercise 10: retro walking 15 pounds 4 laps  Exercise 13: straight leg raise 3 x 10  Exercise 14: bridge 2 x 10     Modalities: 15 minutes - IFC estim and cold pack for swelling reduction and prevent soreness. Assessment  Body Structures, Functions, Activity Limitations Requiring Skilled Therapeutic Intervention: Decreased functional mobility , Decreased ROM, Decreased strength, Decreased endurance, Increased pain, Decreased balance, Decreased high-level IADLs  Assessment: Pt arrives with reports of mild pain in the right knee. Therapist able to direct pt through treatment in order to facilitate functional stability of the right knee. New exercises added per activity log with good pt tolerance. Pt denied change in pain with exercises.  Therapist ended treatment

## 2023-07-07 ENCOUNTER — HOSPITAL ENCOUNTER (OUTPATIENT)
Dept: PHYSICAL THERAPY | Age: 73
Setting detail: THERAPIES SERIES
Discharge: HOME | End: 2023-07-07
Payer: MEDICARE

## 2023-07-07 PROCEDURE — 97110 THERAPEUTIC EXERCISES: CPT

## 2023-07-07 NOTE — PROGRESS NOTES
Phone: Denilson Ventura           Fax: [phone]                           Outpatient Physical Therapy                                                                            Daily Note    Patient: Gio Calderón : 1950  [facility] #: [id number]   Referring Physician: Herminio Sever, DO  Date: 2023     Treatment Diagnosis: right knee pain, difficulty in ambulation    Onset Date: 23  PT Insurance Information: [employer] Medicare  Total # of Visits Approved: 23 Per Physician Order  Total # of Visits to Date: 14  No Show: 0  Canceled Appointment: 0    23 Plan of Care/Recert Due    Pre-Treatment Pain:  0/10  Subjective: Pt states she is feeling \"pretty good right now\". Pt states she doesnt have any current pain on clinic arrival.    Exercises:  Exercise 2: GS Slantboard Stretching 3x30\"  Exercise 3: Knee flexion/extension stretching on step x30 sec each  Exercise 4: terminal knee extension with PTB 2 x 15  Exercise 5: FSU/SSU--8\" 10-15x,  SD 6\" 10-15x; Exercise 6: SciFit 10' lvl 4.0  Exercise 10: retro walking 15 pounds 4 laps  Exercise 12: Sit to stands 2x15 8# 13 inch box 5x  Exercise 15: startrac flex 4 pl SL 10x 2 ext 2 pl 10x    Modalities:   CP 15 mins for discomfort. Assessment  Assessment: Progressed strengthening exercises today with fair tolerance noted. Pt able to stand from 13 inch surface today with mild compensation noted. Will continue. Activity Tolerance  Activity Tolerance: Patient tolerated treatment well    Patient Education  Patient Education: Continue stretching. Pt verbalized/demonstrated good understanding:     [x] Yes         [] No, pt required further clarification.      Post Treatment Pain:  0/10    Plan  Plan Frequency: 2x per week  Plan weeks: 4 weeks     Goals  (Total # of Visits to Date: 15)      Short Term Goals  Time Frame for Short Term Goals: 3 weeks  Short Term Goal 1: Pt will be educated on and initate HEP for mobility

## 2023-07-10 ENCOUNTER — HOSPITAL ENCOUNTER (OUTPATIENT)
Dept: PHYSICAL THERAPY | Age: 73
Setting detail: THERAPIES SERIES
Discharge: HOME | End: 2023-07-10
Payer: MEDICARE

## 2023-07-10 PROCEDURE — 97110 THERAPEUTIC EXERCISES: CPT

## 2023-07-10 PROCEDURE — 97140 MANUAL THERAPY 1/> REGIONS: CPT

## 2023-07-11 NOTE — PROGRESS NOTES
Frequency: 2x per week  Plan weeks: 4 weeks     Goals  (Total # of Visits to Date: 13)      Short Term Goals  Time Frame for Short Term Goals: 3 weeks  Short Term Goal 1: Pt will be educated on and initate HEP for mobility and quad firing - met  Short Term Goal 2: Pt will demonstrate right knee flexion to greater than 95 degrees in order to improve transfers -MET  Short Term Goal 3: Pt will tolerate 30 minutes of standing activity in order improve ability to tolerate household chores -MET    Long Term Goals  Time Frame for Long Term Goals : 4 weeks  Long Term Goal 1: Pt will be independent and compliant with advanced HEP in order to self manage symptoms upon discharge  Long Term Goal 2: Pt will improve right knee flexion to at least 115 degrees in order to improve ability to ambulate up and down steps - progressing (6/19/2023 - Right knee 108 degrees)  Long Term Goal 3: Pt will successfully wean from quad cane in order to improve independence with ambulation - met  Long Term Goal 4: Pt will report 70% improvement in function in order to improve quality of life  Long Term Goal 5: Pt will perform straight leg raise with 0 degrees of quad lag in order to facilitate quad strength as needed for independent ambulation    Minutes Tracking:  Time In: 1128  Time Out: 1225  Minutes: 57  Timed Code Treatment Minutes: Kim Anaya     Date: 7/10/2023

## 2023-07-13 ENCOUNTER — HOSPITAL ENCOUNTER (OUTPATIENT)
Dept: PHYSICAL THERAPY | Age: 73
Setting detail: THERAPIES SERIES
Discharge: HOME | End: 2023-07-13
Payer: MEDICARE

## 2023-07-13 PROCEDURE — 97110 THERAPEUTIC EXERCISES: CPT

## 2023-07-13 NOTE — PROGRESS NOTES
Phone: 0[address] [facility]           Fax: [phone]                           Outpatient Physical Therapy                                                                            Daily Note    Patient: Domonique Hendricks : 1950  CSN #: [id number]   Referring Physician: Guido Alexis DO  Date: 2023    Diagnosis: Z47.1 - aftercare following joint replacement surgery / Z96.651 - presence of right articial knee joint  Treatment Diagnosis: right knee pain, difficulty in ambulation    Onset Date: 23  PT Insurance Information: [employer]na Medicare  Total # of Visits Approved: 19 Per Physician Order  Total # of Visits to Date: 16  No Show: 0  Canceled Appointment: 0    23 Plan of Care/Recert Due    Pre-Treatment Pain:  1/10  Subjective: Pt arrives with reports of minimal pain. Pt expresses all her pain now is in the left knee. Exercises:  Exercise 2: GS Slantboard Stretching 3x30\"  Exercise 3: Knee flexion/extension stretching on step x30 sec each  Exercise 5: FSU/SSU--8\" 10-15x,  SD 6\" 10-15x; Exercise 6: SciFit 10' lvl 4.0  Exercise 7: standing march, abduction, knee flexion, heel raises x20 each on foam  Exercise 12: Sit to stand 3 x 10 on airex pad half half up control pause and down  Exercise 15: startrac extension double leg concentric single limb eccentric 2 x 10 3 plates     Assessment  Assessment: Pt arrived withreports of mild soreness. Therapist progressed pt with increased resistance and stability exercises per activity log. Pt tolerance well, noted fatigue at end of session. Pt able to complete 45 minutes of exercise this date with minimal rest breaks required. Pt ended treatment without pain.     Activity Tolerance  Activity Tolerance: Patient tolerated treatment well    Patient Education  Patient Education: progress walking and functional mobility  Pt verbalized/demonstrated good understanding:     [x] Yes         [] No, pt required further

## 2023-07-17 ENCOUNTER — HOSPITAL ENCOUNTER (OUTPATIENT)
Dept: PHYSICAL THERAPY | Age: 73
Setting detail: THERAPIES SERIES
Discharge: HOME | End: 2023-07-17
Payer: MEDICARE

## 2023-07-17 PROCEDURE — 97110 THERAPEUTIC EXERCISES: CPT

## 2023-07-17 PROCEDURE — 97140 MANUAL THERAPY 1/> REGIONS: CPT

## 2023-07-19 LAB
BASOPHILS ABSOLUTE: 0.1 /ΜL
BASOPHILS RELATIVE PERCENT: 0.7 %
BUN BLDV-MCNC: 12 MG/DL
CALCIUM SERPL-MCNC: 9.4 MG/DL
CHLORIDE BLD-SCNC: 101 MMOL/L
CO2: 29 MMOL/L
CREAT SERPL-MCNC: 0.5 MG/DL
EGFR: 100
EOSINOPHILS ABSOLUTE: 0.1 /ΜL
EOSINOPHILS RELATIVE PERCENT: 0.7 %
GLUCOSE BLD-MCNC: 96 MG/DL
HCT VFR BLD CALC: 38.3 % (ref 36–46)
HEMOGLOBIN: 13.2 G/DL (ref 12–16)
LYMPHOCYTES ABSOLUTE: 2.4 /ΜL
LYMPHOCYTES RELATIVE PERCENT: 27.5 %
MCH RBC QN AUTO: 31.8 PG
MCHC RBC AUTO-ENTMCNC: 34.6 G/DL
MCV RBC AUTO: 92.1 FL
MONOCYTES ABSOLUTE: 0.8 /ΜL
MONOCYTES RELATIVE PERCENT: 9.1 %
NEUTROPHILS ABSOLUTE: 5.3 /ΜL
NEUTROPHILS RELATIVE PERCENT: 62 %
PLATELET # BLD: 292 K/ΜL
PMV BLD AUTO: 7.1 FL
POTASSIUM SERPL-SCNC: 3.2 MMOL/L
RBC # BLD: 4.2 10^6/ΜL
SODIUM BLD-SCNC: 139 MMOL/L
WBC # BLD: 8.6 10^3/ML

## 2023-07-20 ENCOUNTER — HOSPITAL ENCOUNTER (OUTPATIENT)
Dept: PHYSICAL THERAPY | Age: 73
Setting detail: THERAPIES SERIES
Discharge: HOME | End: 2023-07-20
Payer: MEDICARE

## 2023-07-20 PROCEDURE — 97110 THERAPEUTIC EXERCISES: CPT

## 2023-07-20 NOTE — PROGRESS NOTES
Phone: [address]           Fax: [phone]                           Outpatient Physical Therapy                                                                            Daily Note    Patient: Mark Phoenix : 1950  CSN #: [id number]   Referring Physician: Miles Mckeon DO  Date: 2023     Treatment Diagnosis: right knee pain, difficulty in ambulation    Onset Date: 23  PT Insurance Information: A[employer]na Medicare  Total # of Visits Approved: 23 Per Physician Order  Total # of Visits to Date: 18  No Show: 0  Canceled Appointment: 0    23 Plan of Care/Recert Due    Pre-Treatment Pain:  2/10  Subjective: Pt reports her knee is finally feeling like a knee again. She states she knows it isnt fully there but its getting better. Pt rates current pain a 2/10. Exercises:  Exercise 3: Knee flexion/extension stretching on step x30 sec each  Exercise 5: FSU/SSU--8\" 10-15x,  SD 6\" 10-15x; Exercise 6: SciFit 10' lvl 4.0  Exercise 10: retro walking 15 pounds 4 laps  Exercise 12: Sit to stand 3 x 10 on airex pad half half up control pause and down  Exercise 14: -measurments for PT update/ d/c    Modalities: Ice pack 15 mins for discomfort. Assessment  Assessment: Pt reports overall \"over 100% improvement\" in regards to R knee range and strength and states pain on a normal day to day basis stays from a 0-2/10. Pt current R knee AROM 0-121* with 125* achieved with self stretching. Pt R quad MMT 5/5 and hamstring is a 4+/5 with no extensor lag noted today with 5 SLR. Pt able to ascend<>descend 13 6 inch steps in recip motion with 1 UE assist (light). Pt assessed at end of treatment by Elier Hudson, PT DPT for d/c to prep for L TKR 2023. Activity Tolerance  Activity Tolerance: Patient tolerated treatment well    Patient Education  Patient Education: Continue HEP, open swim to prep for next surgery. Work to rest ratio.   Pt verbalized/demonstrated

## 2023-07-24 ENCOUNTER — APPOINTMENT (OUTPATIENT)
Dept: PHYSICAL THERAPY | Age: 73
End: 2023-07-24
Payer: MEDICARE

## 2023-07-27 ENCOUNTER — APPOINTMENT (OUTPATIENT)
Dept: PHYSICAL THERAPY | Age: 73
End: 2023-07-27
Payer: MEDICARE

## 2023-08-31 ENCOUNTER — HOSPITAL ENCOUNTER (OUTPATIENT)
Dept: PHYSICAL THERAPY | Age: 73
Setting detail: THERAPIES SERIES
Discharge: HOME | End: 2023-08-31
Payer: MEDICARE

## 2023-08-31 PROCEDURE — 97161 PT EVAL LOW COMPLEX 20 MIN: CPT

## 2023-08-31 PROCEDURE — G0283 ELEC STIM OTHER THAN WOUND: HCPCS

## 2023-08-31 PROCEDURE — 97110 THERAPEUTIC EXERCISES: CPT

## 2023-09-01 NOTE — PROGRESS NOTES
Phone: [address]          Fax: [phone]                      Outpatient Physical Therapy                                                                      Evaluation  Date: 2023  Patient: Jefrey Habermann  : 1950  [facility] #: [id number]    Referring Physician: Lilibeth Potts DO    Medical Diagnosis: Z47.1 - aftercare following joint replacement surgery / W09.290 - presence of left articial knee joint    Treatment Diagnosis: s/p L TKA, generalized weakness, L knee pain  Onset Date: 23  PT Insurance Information: A[employer]na Medicare  Total # of Visits Approved: 12   Total # of Visits to Date: 1  No Show: 0  Canceled Appointment: 0     Subjective  Subjective: Patient reports L TKA on 23. Patient reports pain ranges from 1/10 at best to 8/10 at worst in the past week. Patient had home health PT prior to this. Patient reports she has been getting around her home well without an AD. She reports she has been icing and performing her HEP regularly.   Additional Pertinent Hx: High blood pressure, vision problems, arthritis,    Observations:   General Observations  Description: Patient's incision is healing well    Ambulation/Gait (if applicable):   Ambulation  WB Status: FWB  Ambulation  Surface: Level tile  Device: No Device  Quality of Gait: antalgic gait of L LE  Distance: 25'  More Ambulation?: No  Stairs/Curb  Stairs?: No    Objective    AROM       AROM LLE (degrees)  L Knee Flexion (0-145): 108  L Knee Extension (0): -1  R knee flexion: 118  R knee extension: 0     Strength RLE  R Hip Flexion: 4+/5  R Hip Extension: NT  R Hip ABduction: 4+/5  R Knee Flexion: 5/5  R Knee Extension: 5/5  R Ankle Dorsiflexion: 5/5  Strength LLE  L Hip Flexion: 4-/5  L Hip Extension: NT  L Hip ABduction: 4/5  L Knee Flexion: 4+/5  L Knee Extension: 4/5  L Ankle Dorsiflexion: 5/5    Exercises:  Exercise 1: HEP: heel prop x3 minutes, quad sets 10 sec hold x10, heel slides

## 2023-09-01 NOTE — PLAN OF CARE
distances without an AD.   Long Term Goal 4: Pt will report 70% improvement in function in order to improve quality of life  Long Term Goal 5: .     Prognosis  Therapy Prognosis: Good    Treatment Plan   Plan Frequency: 3  Plan weeks: 4  [x] HP/CP      [x] Electrical Stim   [x] Therapeutic Exercise      [x] Gait Training  [x] Aquatics   [] Ultrasound         [x] Patient Education/HEP   [x] Manual Therapy  [] Traction    [x] Neuro-ilan        [x] Soft Tissue Mobs            [x] Therapeutic Activity  [] Iontophoresis    [] Orthotic casting/fitting      [] Dry Needling  [] Blood Flow Restriction             Electronically signed by: Shira May PT, DPT    Date: 8/31/2023      ______________________________________ Date: 8/31/2023   Physician Signature

## 2023-09-05 ENCOUNTER — HOSPITAL ENCOUNTER (OUTPATIENT)
Dept: PHYSICAL THERAPY | Age: 73
Setting detail: THERAPIES SERIES
Discharge: HOME | End: 2023-09-05
Payer: MEDICARE

## 2023-09-05 PROCEDURE — G0283 ELEC STIM OTHER THAN WOUND: HCPCS

## 2023-09-05 PROCEDURE — 97140 MANUAL THERAPY 1/> REGIONS: CPT

## 2023-09-05 PROCEDURE — 97110 THERAPEUTIC EXERCISES: CPT

## 2023-09-05 NOTE — PROGRESS NOTES
weeks  Short Term Goal 1: Patient will be initiated with a HEP  Short Term Goal 2: Patient will tolerate 30-40 minutes of therex/act to improve endurance and strength for ADLs. Short Term Goal 3: Patient will improve knee extension to 0* for normal ambulation. Long Term Goals  Time Frame for Long Term Goals : 6 weeks  Long Term Goal 1: Patient will be independent and compliant with a HEP. Long Term Goal 2: Patient will improve L knee ROM to 0-115* for normal ambulation. Long Term Goal 3: Patient will be able to ambulate community distances without an AD.   Long Term Goal 4: Pt will report 70% improvement in function in order to improve quality of life    Minutes Tracking:  Time In: 0815  Time Out: 0916  Minutes: 61  Timed Code Treatment Minutes: [facility]     Date: 9/5/2023

## 2023-09-07 ENCOUNTER — APPOINTMENT (OUTPATIENT)
Dept: PHYSICAL THERAPY | Age: 73
End: 2023-09-07
Payer: MEDICARE

## 2023-09-08 ENCOUNTER — HOSPITAL ENCOUNTER (OUTPATIENT)
Dept: PHYSICAL THERAPY | Age: 73
Setting detail: THERAPIES SERIES
Discharge: HOME | End: 2023-09-08
Payer: MEDICARE

## 2023-09-08 PROCEDURE — 97110 THERAPEUTIC EXERCISES: CPT

## 2023-09-08 PROCEDURE — G0283 ELEC STIM OTHER THAN WOUND: HCPCS

## 2023-09-08 NOTE — PROGRESS NOTES
Phone: [phone]                 [facility]           Fax: [phone]                           Outpatient Physical Therapy                                                                            Daily Note    Patient: Yung Conroy : 1950  CSN #: [id number]   Referring Physician: Milli Morris DO  Date: 2023    Diagnosis: Z47.1 - aftercare following joint replacement surgery / Z96.651 - presence of left articial knee joint  Treatment Diagnosis: s/p L TKA, generalized weakness, L knee pain    Onset Date: 23  PT Insurance Information: A[employer]na Medicare  Total # of Visits Approved: 12 Per Physician Order  Total # of Visits to Date: 3  No Show: 0  Canceled Appointment: 0    23 Plan of Care/Recert Due    Pre-Treatment Pain:  4/10  Subjective: Pt reporting 4/10 pain. Reports she is able to do a full rotation around the stationary bike at [employer]. Pt wanting to restart aquatic program.    Exercises:  Exercise 2: SciFIT level 2.5 x10 minutes  Exercise 3: standing march, abduction, knee flexion, heel raises 2 x 10 each bilateral  Exercise 4: FSU/SSU 6\" 2 x 10  Exercise 7: Side step at counter 4 laps ; tandem walk 4 laps     Modalities: 15 minutes - IFC estim and cold pack to left knee for pain management and swelling reduction       Assessment  Body Structures, Functions, Activity Limitations Requiring Skilled Therapeutic Intervention: Decreased functional mobility , Decreased ROM, Decreased strength, Decreased endurance, Increased pain, Decreased balance, Decreased high-level IADLs  Assessment: Pt arrived with moderate pain. Therapist progressed lower extremity stability with increased standing duration of exercises. Pt able to tolerate 30 minutes of standing activity without increased pain. Mild soreness. Pt demonstrating 0 degrees extension in supine this date.  Therapist ended treatment with IFC estim and cold pack with small heel prop for swelling reduction and pain

## 2023-09-11 ENCOUNTER — HOSPITAL ENCOUNTER (OUTPATIENT)
Dept: PHYSICAL THERAPY | Age: 73
Setting detail: THERAPIES SERIES
Discharge: HOME | End: 2023-09-11
Payer: MEDICARE

## 2023-09-11 PROCEDURE — G0283 ELEC STIM OTHER THAN WOUND: HCPCS

## 2023-09-11 PROCEDURE — 97110 THERAPEUTIC EXERCISES: CPT

## 2023-09-11 PROCEDURE — 97140 MANUAL THERAPY 1/> REGIONS: CPT

## 2023-09-11 NOTE — PROGRESS NOTES
Phone: 7[address] [facility]           Fax: [phone]                           Outpatient Physical Therapy                                                                            Daily Note    Patient: Angela Allan : 1950  [facility] #: [id number]   Referring Physician: Karen Coy DO  Date: 2023    Diagnosis: Z47.1 - aftercare following joint replacement surgery / Z96.651 - presence of left articial knee joint  Treatment Diagnosis: s/p L TKA, generalized weakness, L knee pain    Onset Date: 23  PT Insurance Information: A[employer]na Medicare  Total # of Visits Approved: 12 Per Physician Order  Total # of Visits to Date: 4  No Show: 0  Canceled Appointment: 0    23 Plan of Care/Recert Due    Pre-Treatment Pain:  3/10  Subjective: Pt with 3/10 pain. States her knee is feeling  alittle better    Exercises:  Exercise 1: HEP: heel prop x3 minutes, quad sets 10 sec hold x10, heel slides with strap x10, SLR 2x20, sink exercises x10 ea  Exercise 2: SciFIT level 2.5 x10 minutes  Exercise 3: standing march, abduction, knee flexion, heel raises 2 x 10 each bilateral  Exercise 4: FSU/SSU 6\" 2 x 10  Exercise 5: step lunge 10x  Exercise 7: Side step at counter 4 laps ; tandem walk 4 laps  Exercise 8: sit to stands 5# 10x2    Manual:  Other: knee flexion in supine    Modalities:   IFC/cp for pain     Assessment  Assessment: Pt reports with 3/10 pain. States her knee is feeling better. Functional strengthening completed and pt toleated manual mobs and stretching well. Swelling remains moderate. Knee extension was full today. IFC. cp following session for pain    Activity Tolerance  Activity Tolerance: Patient tolerated treatment well    Patient Education  Patient Education: HEP  Pt verbalized/demonstrated good understanding:     [x] Yes         [] No, pt required further clarification.        Post Treatment Pain:  /10      Plan  Plan Frequency: 3  Plan weeks: 4       Goals  (Total

## 2023-09-13 ENCOUNTER — HOSPITAL ENCOUNTER (OUTPATIENT)
Dept: PHYSICAL THERAPY | Age: 73
Setting detail: THERAPIES SERIES
Discharge: HOME | End: 2023-09-13
Payer: MEDICARE

## 2023-09-13 PROCEDURE — 97140 MANUAL THERAPY 1/> REGIONS: CPT

## 2023-09-13 PROCEDURE — G0283 ELEC STIM OTHER THAN WOUND: HCPCS

## 2023-09-13 PROCEDURE — 97110 THERAPEUTIC EXERCISES: CPT

## 2023-09-13 NOTE — PROGRESS NOTES
Phone: Denilson [place]           Fax: [phone]                           Outpatient Physical Therapy                                                                            Daily Note    Patient: Harish Cotto : 1950  CSN #: [id number]   Referring Physician: Taylor Eason DO  Date: 2023     Treatment Diagnosis: s/p L TKA, generalized weakness, L knee pain    Onset Date: 23  PT Insurance Information: A[employer] Medicare  Total # of Visits Approved: 12 Per Physician Order  Total # of Visits to Date: 5  No Show: 0  Canceled Appointment: 0    23 Plan of Care/Recert Due    Pre-Treatment Pain:  4/10  Subjective: Pt states her knee is still sore but its slowly coming along. Pt rates current pain a 3-410. Exercises:  Exercise 1: HEP: heel prop x3 minutes, quad sets 10 sec hold x10, heel slides with strap x10, SLR 2x20, sink exercises x10 ea  Exercise 2: SciFIT level 2.5 x10 minutes  Exercise 3: standing march, abduction, knee flexion, heel raises 2 x 10 each bilateral  Exercise 4: FSU/SSU 6\" 2 x 10 SD 6 inch  Exercise 5: step lunge 10x  Exercise 7: Side step at counter 4 laps ; tandem walk 4 laps  Exercise 8: sit to stands 5# 10x2  Exercise 9: StarTrac flex 4 pl SL 2x10  Exercise 10: sandor taps 15x (short)    Manual:  Other: knee flexion in supine    Modalities:   IFC with CP 15 mins for discomfort. Assessment  Assessment: L knee flexion AROM 116*. Pt able to complete full session with no rest breaks meeting STG #2. Activity Tolerance  Activity Tolerance: Patient tolerated treatment well    Patient Education  Patient Education: HEP  Pt verbalized/demonstrated good understanding:     [x] Yes         [] No, pt required further clarification.      Post Treatment Pain:  3/10    Plan  Plan Frequency: 3  Plan weeks: 4     Goals  (Total # of Visits to Date: 5)      Short Term Goals  Time Frame for Short Term Goals: 3 weeks  Short Term Goal 1: Patient

## 2023-09-15 ENCOUNTER — HOSPITAL ENCOUNTER (OUTPATIENT)
Dept: PHYSICAL THERAPY | Age: 73
Setting detail: THERAPIES SERIES
Discharge: HOME | End: 2023-09-15
Payer: MEDICARE

## 2023-09-15 PROCEDURE — G0283 ELEC STIM OTHER THAN WOUND: HCPCS

## 2023-09-15 PROCEDURE — 97110 THERAPEUTIC EXERCISES: CPT

## 2023-09-15 PROCEDURE — 97140 MANUAL THERAPY 1/> REGIONS: CPT

## 2023-09-15 NOTE — PROGRESS NOTES
Phone: [facility] [place]           Fax: [phone]                           Outpatient Physical Therapy                                                                            Daily Note    Patient: Maria Del Carmen Stoll : 1950  [facility] #: [id number]   Referring Physician: Manda Garcia DO  Date: 9/15/2023     Treatment Diagnosis: s/p L TKA, generalized weakness, L knee pain    Onset Date: 23  PT Insurance Information: A[employer]na Medicare  Total # of Visits Approved: 12 Per Physician Order  Total # of Visits to Date: 6  No Show: 0  Canceled Appointment: 0    23 Plan of Care/Recert Due    Pre-Treatment Pain:  3/10  Subjective: Pt reports she feels like the knee is getting better and pain seems to be slowly improving. Pt rates current pain a 3/10. Exercises:  Exercise 2: SciFIT level 2.5 x10 minutes  Exercise 4: FSU/SSU 6\" 2 x 10 SD 6 inch  Exercise 5: step lunge 10x  Exercise 7: Side step at counter 4 laps ; tandem walk 4 laps  Exercise 8: sit to stands 5# 10x2  Exercise 9: StarTrac flex 4 pl SL 2x10  Exercise 10: sandor taps 15x (short)    Manual:  Joint Mobilization: Mobs for extension  Other: knee flexion in supine    Modalities:   IFC with CP 15 mins for discomfort    Assessment  Assessment: Pt able to gain full knee ext with mobs today. No increased extensor lag noted with SLR. Activity Tolerance  Activity Tolerance: Patient tolerated treatment well    Patient Education  Patient Education: HEP  Pt verbalized/demonstrated good understanding:     [x] Yes         [] No, pt required further clarification. Post Treatment Pain:  310    Plan  Plan Frequency: 3  Plan weeks: 4     Goals  (Total # of Visits to Date: 6)      Short Term Goals  Time Frame for Short Term Goals: 3 weeks  Short Term Goal 1: Patient will be initiated with a HEP - met  Short Term Goal 2: Patient will tolerate 30-40 minutes of therex/act to improve endurance and strength for ADLs.

## 2023-09-18 ENCOUNTER — HOSPITAL ENCOUNTER (OUTPATIENT)
Dept: PHYSICAL THERAPY | Age: 73
Setting detail: THERAPIES SERIES
Discharge: HOME | End: 2023-09-18
Payer: MEDICARE

## 2023-09-18 PROCEDURE — 97140 MANUAL THERAPY 1/> REGIONS: CPT

## 2023-09-18 PROCEDURE — 97530 THERAPEUTIC ACTIVITIES: CPT

## 2023-09-18 PROCEDURE — G0283 ELEC STIM OTHER THAN WOUND: HCPCS

## 2023-09-18 PROCEDURE — 97110 THERAPEUTIC EXERCISES: CPT

## 2023-09-18 NOTE — PROGRESS NOTES
Phone: [facility] [address]           Fax: [phone]                           Outpatient Physical Therapy                                                                            Daily Note    Patient: Kevin Glez : 1950  CSN #: [id number]   Referring Physician: Madai Munguia DO  Date: 2023       Treatment Diagnosis: s/p L TKA, generalized weakness, L knee pain    Onset Date: 23  PT Insurance Information: A[employer]na Medicare  Total # of Visits Approved: 12 Per Physician Order  Total # of Visits to Date: 7  No Show: 0  Canceled Appointment: 0    23 Plan of Care/Recert Due    Pre-Treatment Pain: numerical value not stated  Subjective: Pt states she was sore/aching over the weekend. She states she has some pain but is mostly stiffness. Exercises:  Exercise 1: HEP: heel prop x3 minutes, quad sets 10 sec hold x10, heel slides with strap x10, SLR 2x20, sink exercises x10 ea  Exercise 2: SciFIT level 3.0 x10 minutes  Exercise 3: standing march, abduction, knee flexion, heel raises 2 x 10 each bilateral  Exercise 4: FSU/SSU 6\" x20 SD 6 inch  Exercise 5: step lunge 10x  Exercise 7: Side step at counter 4 laps ; tandem walk 4 laps  Exercise 8: sit to stands 5# 10x2  Exercise 9: StarTrac flex 4 pl SL 2x10    Manual:  Other: knee flexion in supine    Modalities:   IFC/CP x15 min post tx to decrease pain     Assessment  Assessment: L knee flex after manual= 119-120*. Able to progress this date to x20 continuous reps with step ups/down exercise and sit to stand. Will continue as tolerated. Activity Tolerance  Activity Tolerance: Patient tolerated treatment well    Patient Education  Ex technique, progression  Pt verbalized/demonstrated good understanding:     [x] Yes         [] No, pt required further clarification. Post Treatment Pain: not stated.        Plan  Plan Frequency: 3  Plan weeks: 4       Goals  (Total # of Visits to Date: 7)      Short Term

## 2023-09-20 ENCOUNTER — HOSPITAL ENCOUNTER (OUTPATIENT)
Dept: PHYSICAL THERAPY | Age: 73
Setting detail: THERAPIES SERIES
Discharge: HOME | End: 2023-09-20
Payer: MEDICARE

## 2023-09-20 PROCEDURE — 97140 MANUAL THERAPY 1/> REGIONS: CPT

## 2023-09-20 PROCEDURE — 97530 THERAPEUTIC ACTIVITIES: CPT

## 2023-09-20 PROCEDURE — 97110 THERAPEUTIC EXERCISES: CPT

## 2023-09-20 PROCEDURE — G0283 ELEC STIM OTHER THAN WOUND: HCPCS

## 2023-09-20 NOTE — PROGRESS NOTES
Phone: 0[address] [facility]           Fax: [phone]                           Outpatient Physical Therapy                                                                            Daily Note    Patient: Felipa Valle : 1950  [facility] #: [id number]   Referring Physician: Kori Borjas DO  Date: 2023       Treatment Diagnosis: s/p L TKA, generalized weakness, L knee pain    Onset Date: 23  PT Insurance Information: A[employer]na Medicare  Total # of Visits Approved: 12 Per Physician Order  Total # of Visits to Date: 8  No Show: 0  Canceled Appointment: 0    23 Plan of Care/Recert Due    Pre-Treatment Pain:  4/10  Subjective: Pt report sshe's been more stiff and achey in her L anterior shin and medial knee. States pain is 4/10. Exercises:  Exercise 1: HEP: heel prop x3 minutes, quad sets 10 sec hold x10, heel slides with strap x10, SLR 2x20, sink exercises x10 ea  Exercise 2: SciFIT level 3.0 x10 minutes - 5 min today  Exercise 4: FSU/SSU 6\" x20 SD 6 inch  Exercise 5: step lunge 10x  Exercise 6: TKE YTB 5\" x10  Exercise 7: Side step at counter 4 laps ; tandem walk 4 laps  Exercise 8: sit to stands 5# 10x2  Exercise 11: SLS 3x15\" LLE  Exercise 12: bridge 2x10    Manual:  Joint Mobilization: Mobs for extension  Other: knee flexion in supine    Modalities:   IFC/CP x15 min post tx to decrease post ex soreness and pain    Assessment  Assessment: Increased pain this date throughout tx. Added TKE, bridges and SLS to ex program to increase hip and knee stability. ROM decreased this date d/ stiffness. Continued modalities post tx to decrease pain. WIll continue as tolerated. Activity Tolerance  Activity Tolerance: Patient tolerated treatment well    Patient Education  Ex technique, rationale  Pt verbalized/demonstrated good understanding:     [x] Yes         [] No, pt required further clarification.        Post Treatment Pain: 4 /10      Plan  Plan Frequency: 3  Plan

## 2023-09-22 ENCOUNTER — HOSPITAL ENCOUNTER (OUTPATIENT)
Dept: PHYSICAL THERAPY | Age: 73
Setting detail: THERAPIES SERIES
Discharge: HOME | End: 2023-09-22
Payer: MEDICARE

## 2023-09-22 PROCEDURE — 97530 THERAPEUTIC ACTIVITIES: CPT

## 2023-09-22 PROCEDURE — 97110 THERAPEUTIC EXERCISES: CPT

## 2023-09-22 PROCEDURE — 97140 MANUAL THERAPY 1/> REGIONS: CPT

## 2023-09-22 PROCEDURE — G0283 ELEC STIM OTHER THAN WOUND: HCPCS

## 2023-09-22 NOTE — PROGRESS NOTES
Patient tolerated treatment well    Patient Education  Ex technique, manual rationale  Pt verbalized/demonstrated good understanding:     [x] Yes         [] No, pt required further clarification. Post Treatment Pain:  better but numerical value not given. Plan  Plan Frequency: 3  Plan weeks: 4       Goals  (Total # of Visits to Date: 5)      Short Term Goals  Time Frame for Short Term Goals: 3 weeks  Short Term Goal 1: Patient will be initiated with a HEP - met  Short Term Goal 2: Patient will tolerate 30-40 minutes of therex/act to improve endurance and strength for ADLs. -MET  Short Term Goal 3: Patient will improve knee extension to 0* for normal ambulation. - met    Long Term Goals  Time Frame for Long Term Goals : 6 weeks  Long Term Goal 1: Patient will be independent and compliant with a HEP. Long Term Goal 2: Patient will improve L knee ROM to 0-115* for normal ambulation.  -met  Long Term Goal 3: Patient will be able to ambulate community distances without an AD.-met  Long Term Goal 4: Pt will report 70% improvement in function in order to improve quality of life    Minutes Tracking:  Time In: 0949  Time Out: [address]  Minutes: 56  Timed Code Treatment Minutes: 8263 [address]     Date: 9/22/2023

## 2023-09-25 ENCOUNTER — HOSPITAL ENCOUNTER (OUTPATIENT)
Dept: PHYSICAL THERAPY | Age: 73
Setting detail: THERAPIES SERIES
Discharge: HOME | End: 2023-09-25
Payer: MEDICARE

## 2023-09-25 PROCEDURE — 97530 THERAPEUTIC ACTIVITIES: CPT

## 2023-09-25 PROCEDURE — 97110 THERAPEUTIC EXERCISES: CPT

## 2023-09-25 PROCEDURE — 97140 MANUAL THERAPY 1/> REGIONS: CPT

## 2023-09-25 NOTE — PROGRESS NOTES
Phone: [facility]           Fax: [phone]                           Outpatient Physical Therapy                                                                            Daily Note    Patient: Taylor Ordonez : 1950  CSN #: [id number]   Referring Physician: Paige Garland DO  Date: 2023       Treatment Diagnosis: s/p L TKA, generalized weakness, L knee pain    Onset Date: 23  PT Insurance Information: A[employer]na Medicare  Total # of Visits Approved: 12 Per Physician Order  Total # of Visits to Date: 10  No Show: 0  Canceled Appointment: 0    23 Plan of Care/Recert Due    Pre-Treatment Pain:  5/10  Subjective: Pt reports continued soreness in L shin.  she states it's a little better since last visit. Rates pain 5/10 in this area, no pain in L knee. Pt states she did go for a walk on a track this weekend and took her cane since the ground is uneven. Exercises:  Exercise 1: HEP: heel prop x3 minutes, quad sets 10 sec hold x10, heel slides with strap x10, SLR 2x20, sink exercises x10 ea  Exercise 2: SciFIT level 3.0 x10 minutes - 8 min today  Exercise 3: standing march, abduction, knee flexion, heel raises 2 x 10 each bilateral  Exercise 4: FSU/SSU 6\" x20 SD 6 inch  Exercise 7: Side step at counter 4 laps ; tandem walk 4 laps  Exercise 9: StarTrac flex 4 pl SL x15; ext 1 pl SL x15    Manual:  Soft Tissue Mobilizaton: Efflaurage to L shin to decrease edema with LLE elevation    Modality:     Modality Flow Sheet:   START STOP Tx Modality     Electrical Stim:       Ultrasound: ___ W/cm2 x ___ mins  Duty factor: __100%  __50%  __20% __10%  Head size:   MHz: __1mHz __2 mHz  __3mHz  Location:     Hot Pack:     Paraffin:   959 1014 Cold Pack: x15 min to decrease pain/edema in L knee/shin     Dry Needling: ___\" needle to superficial radial, deep radial and  lataeral antebrachial cutaneous at homeostatic neuro- trigger points to. ..   ___No

## 2023-09-27 ENCOUNTER — HOSPITAL ENCOUNTER (OUTPATIENT)
Dept: PHYSICAL THERAPY | Age: 73
Setting detail: THERAPIES SERIES
Discharge: HOME | End: 2023-09-27
Payer: MEDICARE

## 2023-09-27 PROCEDURE — 97110 THERAPEUTIC EXERCISES: CPT

## 2023-09-27 PROCEDURE — 97530 THERAPEUTIC ACTIVITIES: CPT

## 2023-09-27 NOTE — PROGRESS NOTES
Phone: [facility]ilson [facility]           Fax: [phone]                           Outpatient Physical Therapy                                                                            Daily Note    Patient: Shavon Coker : 1950  CSN #: [id number]   Referring Physician: Michelet Fall DO  Date: 2023       Treatment Diagnosis: s/p L TKA, generalized weakness, L knee pain    Onset Date: 23  PT Insurance Information: A[employer]na Medicare  Total # of Visits Approved: 12 Per Physician Order  Total # of Visits to Date: 11  No Show: 0  Canceled Appointment: 0    23 Plan of Care/Recert Due    Pre-Treatment Pain:  not stated   Subjective: Davies discomfort is about the same, moderate antalgic gait present when initiating gait that reduces some as distance increases in clinic. Pt reports STM that has been completed the last few visits has helped some but is not lasting. She reports she did open swim yesterday and plans to do today as well. She states she went to watch a golf match yesterday and was limited by fatigue and some pain. Exercises:  Exercise 1: HEP: heel prop x3 minutes, quad sets 10 sec hold x10, heel slides with strap x10, SLR 2x20, sink exercises x10 ea  Exercise 2: SciFIT level 3.5 x10 minutes  Exercise 3: standing march, abduction, knee flexion, heel raises 2 x 10 each bilateral  Exercise 4: FSU/SSU 6\" x20 SD 6 inch  Exercise 5: step lunge 10x  Exercise 7: Side step at counter 4 laps ; tandem walk 4 laps - no tandem walk, did retro walk instead  Exercise 8: sit to stands 5# 10x2  Exercise 9: StarTrac flex 4 pl SL x15; ext 1 pl SL x15  Exercise 13: Yefri step over (6 in ) x10 ea -- fwd and lat      Assessment  Assessment: Pt more quick to fatigue today with strengthening exercises but able to continue with short rest breaks between sets. Pt verbalizes 75-80% improvement in symptoms and function since starting PT.   Pt declined need for modalities this date

## 2023-09-28 ENCOUNTER — OFFICE VISIT (OUTPATIENT)
Dept: PRIMARY CARE CLINIC | Age: 73
End: 2023-09-28
Payer: MEDICARE

## 2023-09-28 VITALS
SYSTOLIC BLOOD PRESSURE: 138 MMHG | TEMPERATURE: 98.6 F | HEART RATE: 102 BPM | DIASTOLIC BLOOD PRESSURE: 82 MMHG | BODY MASS INDEX: 26.23 KG/M2 | WEIGHT: 133.6 LBS | RESPIRATION RATE: 16 BRPM | OXYGEN SATURATION: 97 % | HEIGHT: 60 IN

## 2023-09-28 DIAGNOSIS — Z00.00 MEDICARE ANNUAL WELLNESS VISIT, SUBSEQUENT: ICD-10-CM

## 2023-09-28 DIAGNOSIS — I10 ESSENTIAL HYPERTENSION: ICD-10-CM

## 2023-09-28 DIAGNOSIS — E78.5 HYPERLIPIDEMIA, UNSPECIFIED HYPERLIPIDEMIA TYPE: Primary | ICD-10-CM

## 2023-09-28 PROCEDURE — 3075F SYST BP GE 130 - 139MM HG: CPT | Performed by: NURSE PRACTITIONER

## 2023-09-28 PROCEDURE — G0439 PPPS, SUBSEQ VISIT: HCPCS | Performed by: NURSE PRACTITIONER

## 2023-09-28 PROCEDURE — 1123F ACP DISCUSS/DSCN MKR DOCD: CPT | Performed by: NURSE PRACTITIONER

## 2023-09-28 PROCEDURE — 3079F DIAST BP 80-89 MM HG: CPT | Performed by: NURSE PRACTITIONER

## 2023-09-28 RX ORDER — HYDROCHLOROTHIAZIDE 12.5 MG/1
CAPSULE, GELATIN COATED ORAL
Qty: 90 CAPSULE | Refills: 1 | Status: SHIPPED | OUTPATIENT
Start: 2023-09-28

## 2023-09-28 RX ORDER — AMLODIPINE BESYLATE 5 MG/1
5 TABLET ORAL DAILY
Qty: 90 TABLET | Refills: 3 | Status: SHIPPED | OUTPATIENT
Start: 2023-09-28 | End: 2023-12-27

## 2023-09-28 SDOH — ECONOMIC STABILITY: FOOD INSECURITY: WITHIN THE PAST 12 MONTHS, YOU WORRIED THAT YOUR FOOD WOULD RUN OUT BEFORE YOU GOT MONEY TO BUY MORE.: NEVER TRUE

## 2023-09-28 SDOH — ECONOMIC STABILITY: INCOME INSECURITY: HOW HARD IS IT FOR YOU TO PAY FOR THE VERY BASICS LIKE FOOD, HOUSING, MEDICAL CARE, AND HEATING?: NOT HARD AT ALL

## 2023-09-28 SDOH — ECONOMIC STABILITY: FOOD INSECURITY: WITHIN THE PAST 12 MONTHS, THE FOOD YOU BOUGHT JUST DIDN'T LAST AND YOU DIDN'T HAVE MONEY TO GET MORE.: NEVER TRUE

## 2023-09-28 SDOH — ECONOMIC STABILITY: HOUSING INSECURITY
IN THE LAST 12 MONTHS, WAS THERE A TIME WHEN YOU DID NOT HAVE A STEADY PLACE TO SLEEP OR SLEPT IN A SHELTER (INCLUDING NOW)?: NO

## 2023-09-28 ASSESSMENT — PATIENT HEALTH QUESTIONNAIRE - PHQ9
SUM OF ALL RESPONSES TO PHQ QUESTIONS 1-9: 0
1. LITTLE INTEREST OR PLEASURE IN DOING THINGS: 0
2. FEELING DOWN, DEPRESSED OR HOPELESS: 0
SUM OF ALL RESPONSES TO PHQ QUESTIONS 1-9: 0
SUM OF ALL RESPONSES TO PHQ QUESTIONS 1-9: 0
SUM OF ALL RESPONSES TO PHQ9 QUESTIONS 1 & 2: 0
SUM OF ALL RESPONSES TO PHQ QUESTIONS 1-9: 0

## 2023-09-28 ASSESSMENT — LIFESTYLE VARIABLES
HOW OFTEN DO YOU HAVE A DRINK CONTAINING ALCOHOL: NEVER
HOW MANY STANDARD DRINKS CONTAINING ALCOHOL DO YOU HAVE ON A TYPICAL DAY: PATIENT DOES NOT DRINK

## 2023-09-29 ENCOUNTER — HOSPITAL ENCOUNTER (OUTPATIENT)
Dept: PHYSICAL THERAPY | Age: 73
Setting detail: THERAPIES SERIES
End: 2023-09-29
Payer: MEDICARE

## 2023-09-29 NOTE — PROGRESS NOTES
[facility]  Inpatient/Observation/Outpatient Rehabilitation    Date: 2023  Patient Name: Harish Cotto       [] Inpatient Acute/Observation       []  Outpatient  : 1950         [] Pt no showed for scheduled appointment    [] Pt refused/declined therapy at this time due to:           [x] Pt cancelled due to:  [] No Reason Given   [] Sick/ill   [] Other:    Was in ER all night with . She is tired will be here on Monday. Therapist/Assistant will attempt to see this patient, at our earliest opportunity.        Marina Hazel Date: 2023

## 2023-10-02 ENCOUNTER — HOSPITAL ENCOUNTER (OUTPATIENT)
Dept: PHYSICAL THERAPY | Age: 73
Setting detail: THERAPIES SERIES
Discharge: HOME | End: 2023-10-02
Payer: MEDICARE

## 2023-10-02 PROCEDURE — G0283 ELEC STIM OTHER THAN WOUND: HCPCS

## 2023-10-02 PROCEDURE — 97140 MANUAL THERAPY 1/> REGIONS: CPT

## 2023-10-02 PROCEDURE — 97110 THERAPEUTIC EXERCISES: CPT

## 2023-10-02 NOTE — PROGRESS NOTES
Phone: [address] [place] [address]           Fax: [phone]                           Outpatient Physical Therapy                                                                            Daily Note    Patient: Adrienne Adler : 1950  CSN #: [id number]   Referring Physician: Fazal Elizondo DO  Date: 10/2/2023     Treatment Diagnosis: s/p L TKA, generalized weakness, L knee pain    Onset Date: 23  PT Insurance Information: A[employer]na Medicare  Total # of Visits Approved: 12 Per Physician Order  Total # of Visits to Date: 12  No Show: 0  Canceled Appointment: 0      Plan of Care/Recert Due    Pre-Treatment Pain:  7/10  Subjective: Pt states she is still having ant shin and knee pain. Pt rates current pain a 7/10. Exercises:  Exercise 2: SciFIT level 3.5 x10 minutes  Exercise 5: step lunge 10x  Exercise 6: TKE YTB 5\" x10  Exercise 11: SLS 3x15\" LLE    Manual:  Soft Tissue Mobilizaton: Efflaurage to L shin to decrease edema with LLE elevation  Other: knee flexion in supine    Modality:     Modality Flow Sheet:   Performed (X) Tx Modality   x Electrical Stim: IFC with CP 15 mins for discomfort. Ultrasound: ___ W/cm2 x ___ mins  Duty factor: __100%  __50%  __20% __10%  Head size:   MHz: __1mHz __2 mHz  __3mHz  Location:    Hot Pack:    Cold Pack:    Cervical Traction:  Pull:  Weight ____  Rest ____    Hold Time: _____ sec   Rest: ____  Total Tx Time: ____min    Lumbar Traction:  Pull:  Weight ____  Rest ____    Hold Time: _____ sec   Rest: ____  Total Tx Time: ____min  Bed Split:  Yes _____   or   No  _____    Iontophoresis:  Dexamethasone ______  Other: ______  Doseage: _____mA        Dry Needling: ___\" needle to superficial radial, deep radial and  lataeral antebrachial cutaneous at homeostatic neuro- trigger points to. ..   ___No manipulation/static  ___Basic Manipulation  ___Pistoning Manipulation  ___Needle Rotation  ___Tenting       Assessment  Assessment: L knee AROM

## 2023-10-06 ENCOUNTER — HOSPITAL ENCOUNTER (OUTPATIENT)
Dept: PHYSICAL THERAPY | Age: 73
Setting detail: THERAPIES SERIES
Discharge: HOME | End: 2023-10-06
Payer: MEDICARE

## 2023-10-06 PROCEDURE — 97530 THERAPEUTIC ACTIVITIES: CPT

## 2023-10-06 PROCEDURE — 97140 MANUAL THERAPY 1/> REGIONS: CPT

## 2023-10-06 PROCEDURE — G0283 ELEC STIM OTHER THAN WOUND: HCPCS

## 2023-10-06 PROCEDURE — 97110 THERAPEUTIC EXERCISES: CPT

## 2023-10-06 NOTE — PROGRESS NOTES
Phone: Denilson [facility]           Fax: [phone]                           Outpatient Physical Therapy                                                                            Daily Note    Patient: Yung Conroy : 1950  [facility] #: [id number]   Referring Physician: Milli Morris, DO  Date: 10/6/2023       Treatment Diagnosis: s/p L TKA, generalized weakness, L knee pain    Onset Date: 23  PT Insurance Information: Aetna Medicare  Total # of Visits Approved: 12 Per Physician Order  Total # of Visits to Date: 13  No Show: 0  Canceled Appointment: 0    23 Plan of Care/Recert Due    Pre-Treatment Pain:  4/10  Subjective: Pt reports pain is doing a little better. Pt reports 4/10 pain currently in anterior shin. Exercises:  Exercise 1: HEP: heel prop x3 minutes, quad sets 10 sec hold x10, heel slides with strap x10, SLR 2x20, sink exercises x10 ea  Exercise 2: SciFIT level 3.5 x10 minutes  Exercise 4: FSU/SSU 6\" x20 SD 6 inch  Exercise 5: step lunge 10x  Exercise 6: TKE YTB 5\" x10 - with ball against wall today. Exercise 8: sit to stands 5# 10x2  Exercise 13: Yefri step over (6 in ) x10 ea -- fwd and lat - just fwd. Manual:  Other: knee flexion in supine    Modality:     Modality Flow Sheet:   Performed (X) Tx Modality   x Electrical Stim: IFC/CP x15 min to decrease pain      Ultrasound: ___ W/cm2 x ___ mins  Duty factor: __100%  __50%  __20% __10%  Head size:   MHz: __1mHz __2 mHz  __3mHz  Location:    Hot Pack:   x Cold Pack: x15 min with IFC to decrease pain.      Cervical Traction:  Pull:  Weight ____  Rest ____    Hold Time: _____ sec   Rest: ____  Total Tx Time: ____min    Lumbar Traction:  Pull:  Weight ____  Rest ____    Hold Time: _____ sec   Rest: ____  Total Tx Time: ____min  Bed Split:  Yes _____   or   No  _____    Iontophoresis:  Dexamethasone ______  Other: ______  Doseage: _____mA        Dry Needling: ___\" needle to superficial radial, deep

## 2023-10-10 ENCOUNTER — HOSPITAL ENCOUNTER (OUTPATIENT)
Dept: PHYSICAL THERAPY | Age: 73
Setting detail: THERAPIES SERIES
Discharge: HOME | End: 2023-10-10
Payer: MEDICARE

## 2023-10-10 PROCEDURE — 97140 MANUAL THERAPY 1/> REGIONS: CPT

## 2023-10-10 PROCEDURE — 97110 THERAPEUTIC EXERCISES: CPT

## 2023-10-10 PROCEDURE — G0283 ELEC STIM OTHER THAN WOUND: HCPCS

## 2023-10-10 NOTE — PROGRESS NOTES
Phone: [address] [place] [address]           Fax: [phone]                           Outpatient Physical Therapy                                                                            Daily Note    Patient: Yung Conroy : 1950  CSN #: [id number]   Referring Physician: Milli Morris DO  Date: 10/10/2023    Diagnosis: Z47.1 - aftercare following joint replacement surgery / Z96.651 - presence of left articial knee joint  Treatment Diagnosis: s/p L TKA, generalized weakness, L knee pain    Onset Date: 23  Total # of Visits Approved: 20 Per Physician Order  Total # of Visits to Date: 14  No Show: 0  Canceled Appointment: 0    10/27/23 Plan of Care/Recert Due    Pre-Treatment Pain:  2/10  Subjective: Pt states her pain is around a 2/10 with taking pain meds    Exercises:  Exercise 2: SciFIT level 3.5 x10 minutes  Exercise 4: FSU 8\"/SSU 6\" x20 SD 6 inch  Exercise 5: step lunge 10x  Exercise 6: TKE YTB 5\" x10 - with ball against wall today. Exercise 8: sit to stands 7# 10x2  Exercise 10: sandor taps 15x 8\"  Exercise 13: Sandor step over (8 in ) x10 ea -- fwd and lat - just fwd. Manual:  Other: knee flexion in supine    Modality:     Modality Flow Sheet:   Performed (X) Tx Modality   x Electrical Stim: For post session pain       Ultrasound: ___ W/cm2 x ___ mins  Duty factor: __100%  __50%  __20% __10%  Head size:   MHz: __1mHz __2 mHz  __3mHz  Location:    Hot Pack:   x Cold Pack:    Cervical Traction:  Pull:  Weight ____  Rest ____    Hold Time: _____ sec   Rest: ____  Total Tx Time: ____min    Lumbar Traction:  Pull:  Weight ____  Rest ____    Hold Time: _____ sec   Rest: ____  Total Tx Time: ____min  Bed Split:  Yes _____   or   No  _____    Iontophoresis:  Dexamethasone ______  Other: ______  Doseage: _____mA        Dry Needling: ___\" needle to superficial radial, deep radial and  lataeral antebrachial cutaneous at homeostatic neuro- trigger points to. ..   ___No

## 2023-10-13 ENCOUNTER — HOSPITAL ENCOUNTER (OUTPATIENT)
Dept: PHYSICAL THERAPY | Age: 73
Setting detail: THERAPIES SERIES
Discharge: HOME | End: 2023-10-13
Payer: MEDICARE

## 2023-10-13 PROCEDURE — 97530 THERAPEUTIC ACTIVITIES: CPT

## 2023-10-13 PROCEDURE — G0283 ELEC STIM OTHER THAN WOUND: HCPCS

## 2023-10-13 PROCEDURE — 97140 MANUAL THERAPY 1/> REGIONS: CPT

## 2023-10-13 PROCEDURE — 97110 THERAPEUTIC EXERCISES: CPT

## 2023-10-13 NOTE — PROGRESS NOTES
Phone: [address] [place] [address]           Fax: [phone]                           Outpatient Physical Therapy                                                                            Daily Note    Patient: Yg Elizondo : 1950  [facility] #: [id number]   Referring Physician: Indira Ballard DO  Date: 10/13/2023       Treatment Diagnosis: s/p L TKA, generalized weakness, L knee pain    Onset Date: 23  PT Insurance Information: Aetna Medicare  Total # of Visits Approved: 20 Per Physician Order  Total # of Visits to Date: 15  No Show: 0  Canceled Appointment: 0    10/27/23 Plan of Care/Recert Due    Pre-Treatment Pain:  3/10  Subjective: Pt states pain is 3/10, mostly in L shin. States her pain has been doing better lately.     Exercises:  Exercise 1: HEP: heel prop x3 minutes, quad sets 10 sec hold x10, heel slides with strap x10, SLR 2x20, sink exercises x10 ea  Exercise 2: SciFIT level 3.5 x10 minutes  Exercise 3: standing march, abduction, knee flexion, heel raises 2 x 10 each bilateral  Exercise 4: FSU 8\"/SSU 6\" x20 SD 6 inch  Exercise 5: step lunge 10x  Exercise 7: Side step at counter 4 laps, did retro walk instead  Exercise 8: sit to stands 7# 10x2  Exercise 12: Cybex squats DL 2 pl 2x10    Manual:  Other: knee flexion in supine    Modality:     Modality Flow Sheet:   Performed (X) Tx Modality   x Electrical Stim:  IFC/CP x15 min post tx to decrease pain and edema      Ultrasound: ___ W/cm2 x ___ mins  Duty factor: __100%  __50%  __20% __10%  Head size:   MHz: __1mHz __2 mHz  __3mHz  Location:    Hot Pack:   x Cold Pack: x15 min with IFC    Cervical Traction:  Pull:  Weight ____  Rest ____    Hold Time: _____ sec   Rest: ____  Total Tx Time: ____min    Lumbar Traction:  Pull:  Weight ____  Rest ____    Hold Time: _____ sec   Rest: ____  Total Tx Time: ____min  Bed Split:  Yes _____   or   No  _____    Iontophoresis:  Dexamethasone ______  Other: ______  Doseage: _____mA

## 2023-10-17 ENCOUNTER — HOSPITAL ENCOUNTER (OUTPATIENT)
Dept: PHYSICAL THERAPY | Age: 73
Setting detail: THERAPIES SERIES
Discharge: HOME | End: 2023-10-17
Payer: MEDICARE

## 2023-10-17 PROCEDURE — 97110 THERAPEUTIC EXERCISES: CPT

## 2023-10-17 NOTE — PROGRESS NOTES
Phone: 0[address] [facility]           Fax: [phone]                           Outpatient Physical Therapy                                                                            Daily Note    Patient: Adrienne Adler : 1950  [facility] #: [id number]   Referring Physician: Fazal Elizondo, DO  Date: 10/17/2023    Diagnosis: Z47.1 - aftercare following joint replacement surgery / Z96.651 - presence of left articial knee joint  Treatment Diagnosis: s/p L TKA, generalized weakness, L knee pain    PT Insurance Information: A[employer]na Medicare  Total # of Visits Approved: 20 Per Physician Order  Total # of Visits to Date: 16  No Show: 0  Canceled Appointment: 0    10/27/23 Plan of Care/Recert Due    Pre-Treatment Pain:  3/10  Subjective: Pt states pain remains 3/10 in L knee    Exercises:  Exercise 2: SciFIT level 3.5 x10 minutes  Exercise 4: FSU 8\"/SSU 6\" x20 SD 6 inch  Exercise 8: sit to stands 7# 10x2  Exercise 10: yefri taps 15x 8\"  Exercise 12: Cybex squats DL 2 pl 2x10  Exercise 13: Yefri step over (10 in ) x10 ea -- fwd and lat    Manual:  Other: knee flexion in supine    Modality:     Modality Flow Sheet:   Performed (X) Tx Modality   x Cold Pack:         Assessment  Assessment: Pt states pain is 3/10 in knee and shin. Pt is doing well with functional strengthening. PROM is 120 flexion. Functional step downs eremain at 6\". Will continue to priogress as tolerated ICE following session    Activity Tolerance  Activity Tolerance: Patient tolerated treatment well    Patient Education  Patient Education: HEP  Pt verbalized/demonstrated good understanding:     [x] Yes         [] No, pt required further clarification.        Post Treatment Pain:  3/10      Plan  Plan Frequency: 3  Plan weeks: 4       Goals  (Total # of Visits to Date: 12)      Short Term Goals  Time Frame for Short Term Goals: 3 weeks  Short Term Goal 1: Patient will be initiated with a HEP - met  Short Term Goal 2: Patient

## 2023-10-20 ENCOUNTER — HOSPITAL ENCOUNTER (OUTPATIENT)
Dept: PHYSICAL THERAPY | Age: 73
Setting detail: THERAPIES SERIES
Discharge: HOME | End: 2023-10-20
Payer: MEDICARE

## 2023-10-20 PROCEDURE — 97110 THERAPEUTIC EXERCISES: CPT

## 2023-10-20 NOTE — PROGRESS NOTES
homeostatic neuro- trigger points to. ..   ___No manipulation/static  ___Basic Manipulation  ___Pistoning Manipulation  ___Needle Rotation  ___Tenting       Assessment  Assessment: Pt displays no extensor lag with supine SLR. Full extension noted with overpressure. Pt continues to display antalgic gait cycle. Will continue to progress as tolerable. Activity Tolerance  Activity Tolerance: Patient tolerated treatment well    Patient Education  Patient Education: HEP  Pt verbalized/demonstrated good understanding:     [x] Yes         [] No, pt required further clarification. Post Treatment Pain:  2/10    Plan  Plan Frequency: 3  Plan weeks: 4     Goals  (Total # of Visits to Date: 16)      Short Term Goals  Time Frame for Short Term Goals: 3 weeks  Short Term Goal 1: Patient will be initiated with a HEP - met  Short Term Goal 2: Patient will tolerate 30-40 minutes of therex/act to improve endurance and strength for ADLs. -MET  Short Term Goal 3: Patient will improve knee extension to 0* for normal ambulation. - met    Long Term Goals  Time Frame for Long Term Goals : 6 weeks  Long Term Goal 1: Patient will be independent and compliant with a HEP. Long Term Goal 2: Patient will improve L knee ROM to 0-115* for normal ambulation.  -met  Long Term Goal 3: Patient will be able to ambulate community distances without an AD.-met  Long Term Goal 4: Pt will report 70% improvement in function in order to improve quality of life - met    Minutes Tracking:  Time In: 0901  Time Out: 1000  Minutes: 59  Timed Code Treatment Minutes: [address] [place], [place]     Date: 10/20/2023

## 2023-10-24 ENCOUNTER — HOSPITAL ENCOUNTER (OUTPATIENT)
Dept: PHYSICAL THERAPY | Age: 73
Setting detail: THERAPIES SERIES
Discharge: HOME | End: 2023-10-24
Payer: MEDICARE

## 2023-10-24 PROCEDURE — 97110 THERAPEUTIC EXERCISES: CPT

## 2023-10-24 NOTE — PROGRESS NOTES
___No manipulation/static  ___Basic Manipulation  ___Pistoning Manipulation  ___Needle Rotation  ___Tenting       Assessment  Assessment: L knee A/ AAROM ROM in supine 124*. Pt continues to amb with antalgic gait nitin but slowly progressing. Will continue. Activity Tolerance  Activity Tolerance: Patient tolerated treatment well    Patient Education  Patient Education: HEP  Pt verbalized/demonstrated good understanding:     [x] Yes         [] No, pt required further clarification. Post Treatment Pain:  1/10    Plan  Plan Frequency: 3  Plan weeks: 4     Goals  (Total # of Visits to Date: 25)      Short Term Goals  Time Frame for Short Term Goals: 3 weeks  Short Term Goal 1: Patient will be initiated with a HEP - met  Short Term Goal 2: Patient will tolerate 30-40 minutes of therex/act to improve endurance and strength for ADLs. -MET  Short Term Goal 3: Patient will improve knee extension to 0* for normal ambulation. - met    Long Term Goals  Time Frame for Long Term Goals : 6 weeks  Long Term Goal 1: Patient will be independent and compliant with a HEP. Long Term Goal 2: Patient will improve L knee ROM to 0-115* for normal ambulation.  -met  Long Term Goal 3: Patient will be able to ambulate community distances without an AD.-met  Long Term Goal 4: Pt will report 70% improvement in function in order to improve quality of life - met    Minutes Tracking:  Time In: 0831  Time Out: 0930  Minutes: 59  Timed Code Treatment Minutes: [address] [place], [place]     Date: 10/24/2023

## 2023-10-27 ENCOUNTER — HOSPITAL ENCOUNTER (OUTPATIENT)
Dept: PHYSICAL THERAPY | Age: 73
Setting detail: THERAPIES SERIES
Discharge: HOME | End: 2023-10-27
Payer: MEDICARE

## 2023-10-27 PROCEDURE — 97140 MANUAL THERAPY 1/> REGIONS: CPT

## 2023-10-27 PROCEDURE — 97110 THERAPEUTIC EXERCISES: CPT

## 2023-10-27 PROCEDURE — 97530 THERAPEUTIC ACTIVITIES: CPT

## 2023-10-27 NOTE — PROGRESS NOTES
Phone: [address] [facility]           Fax: [phone]                           Outpatient Physical Therapy                                                                            Daily Note    Patient: Taylor Ordonez : 1950  CSN #: [id number]   Referring Physician: Paige Garland DO  Date: 10/27/2023       Treatment Diagnosis: s/p L TKA, generalized weakness, L knee pain    Onset Date: 23  PT Insurance Information: [employer] Medicare  Total # of Visits Approved: 20 Per Physician Order  Total # of Visits to Date: 19  No Show: 0  Canceled Appointment: 0    10/27/23 Plan of Care/Recert Due    Pre-Treatment Pain:  1/10  Subjective: Pt reports pain has improved. Reports 1/10. Exercises:  Exercise 1: HEP: heel prop x3 minutes, quad sets 10 sec hold x10, heel slides with strap x10, SLR 2x20, sink exercises x10 ea  Exercise 2: SciFIT level 3.5 x10 minutes  Exercise 4: FSU 8\"/SSU 6\" x20 SD 6 inch  Exercise 5: step lunge 10x  Exercise 8: sit to stands 7# 10x2 no weight today, black box with pad  Exercise 10: [place] retro 11# 5x  Exercise 14: small lunges 10x alt  Exercise 16: step over 3 6 in hurdles x2 fwd/ lateral    Manual:  Other: knee flexion in supine    Modality:     Modality Flow Sheet:   Performed (X) Tx Modality    Electrical Stim:      Ultrasound: ___ W/cm2 x ___ mins  Duty factor: __100%  __50%  __20% __10%  Head size:   MHz: __1mHz __2 mHz  __3mHz  Location:    Hot Pack:   X  Cold Pack:  x15 min post tx to decrease soreness. Cervical Traction:  Pull:  Weight ____  Rest ____    Hold Time: _____ sec   Rest: ____  Total Tx Time: ____min    Lumbar Traction:  Pull:  Weight ____  Rest ____    Hold Time: _____ sec   Rest: ____  Total Tx Time: ____min  Bed Split:  Yes _____   or   No  _____    Iontophoresis:  Dexamethasone ______  Other: ______  Doseage: _____mA        Dry Needling: ___\" needle to at [facility] neuro- trigger points to. ..   ___No

## 2023-10-31 ENCOUNTER — HOSPITAL ENCOUNTER (OUTPATIENT)
Dept: PHYSICAL THERAPY | Age: 73
Setting detail: THERAPIES SERIES
Discharge: HOME | End: 2023-10-31
Payer: MEDICARE

## 2023-10-31 PROCEDURE — 97530 THERAPEUTIC ACTIVITIES: CPT

## 2023-10-31 PROCEDURE — 97110 THERAPEUTIC EXERCISES: CPT

## 2024-01-30 ENCOUNTER — OFFICE VISIT (OUTPATIENT)
Dept: PRIMARY CARE CLINIC | Age: 74
End: 2024-01-30
Payer: MEDICARE

## 2024-01-30 VITALS
HEART RATE: 78 BPM | OXYGEN SATURATION: 99 % | SYSTOLIC BLOOD PRESSURE: 122 MMHG | DIASTOLIC BLOOD PRESSURE: 76 MMHG | WEIGHT: 142 LBS | BODY MASS INDEX: 27.73 KG/M2 | TEMPERATURE: 98.1 F | RESPIRATION RATE: 18 BRPM

## 2024-01-30 DIAGNOSIS — R19.5 POSITIVE FIT (FECAL IMMUNOCHEMICAL TEST): ICD-10-CM

## 2024-01-30 DIAGNOSIS — T30.0 BURN: Primary | ICD-10-CM

## 2024-01-30 PROCEDURE — 1123F ACP DISCUSS/DSCN MKR DOCD: CPT | Performed by: NURSE PRACTITIONER

## 2024-01-30 PROCEDURE — 3074F SYST BP LT 130 MM HG: CPT | Performed by: NURSE PRACTITIONER

## 2024-01-30 PROCEDURE — 99214 OFFICE O/P EST MOD 30 MIN: CPT | Performed by: NURSE PRACTITIONER

## 2024-01-30 PROCEDURE — 3078F DIAST BP <80 MM HG: CPT | Performed by: NURSE PRACTITIONER

## 2024-01-30 RX ORDER — CEPHALEXIN 500 MG/1
500 CAPSULE ORAL 2 TIMES DAILY
Qty: 20 CAPSULE | Refills: 0 | Status: SHIPPED | OUTPATIENT
Start: 2024-01-30 | End: 2024-02-09

## 2024-01-30 ASSESSMENT — PATIENT HEALTH QUESTIONNAIRE - PHQ9
SUM OF ALL RESPONSES TO PHQ QUESTIONS 1-9: 0
SUM OF ALL RESPONSES TO PHQ QUESTIONS 1-9: 0
SUM OF ALL RESPONSES TO PHQ9 QUESTIONS 1 & 2: 0
SUM OF ALL RESPONSES TO PHQ QUESTIONS 1-9: 0
2. FEELING DOWN, DEPRESSED OR HOPELESS: 0
SUM OF ALL RESPONSES TO PHQ QUESTIONS 1-9: 0
1. LITTLE INTEREST OR PLEASURE IN DOING THINGS: 0

## 2024-01-30 ASSESSMENT — ENCOUNTER SYMPTOMS
ALLERGIC/IMMUNOLOGIC NEGATIVE: 1
RESPIRATORY NEGATIVE: 1
GASTROINTESTINAL NEGATIVE: 1
EYES NEGATIVE: 1

## 2024-01-30 NOTE — PROGRESS NOTES
(TYLENOL) 500 MG tablet Take 1 tablet by mouth every 6 hours as needed   Yes Provider, MD Monique   levothyroxine (SYNTHROID) 75 MCG tablet Takes 5  days/week 11/22/19  Yes Provider, MD Monique        Allergies:       Patient has no known allergies.    Social History:     Tobacco:    reports that she quit smoking about 32 years ago. Her smoking use included cigarettes. She started smoking about 52 years ago. She has a 20.0 pack-year smoking history. She has never used smokeless tobacco.  Alcohol:      reports no history of alcohol use.  Drug Use:  reports no history of drug use.    Family History:     Family History   Problem Relation Age of Onset    High Blood Pressure Father     High Blood Pressure Sister     High Blood Pressure Other        Review of Systems:     Positive and Negative as described in HPI    Review of Systems   Constitutional: Negative.  Negative for fever.   HENT: Negative.     Eyes: Negative.    Respiratory: Negative.     Cardiovascular: Negative.    Gastrointestinal: Negative.    Endocrine: Negative.    Genitourinary: Negative.    Musculoskeletal: Negative.    Skin:  Positive for wound.   Allergic/Immunologic: Negative.    Neurological: Negative.    Hematological: Negative.    Psychiatric/Behavioral: Negative.         Physical Exam:   Vitals:  /76 (Site: Left Upper Arm, Position: Sitting)   Pulse 78   Temp 98.1 °F (36.7 °C) (Temporal)   Resp 18   Wt 64.4 kg (142 lb)   SpO2 99%   BMI 27.73 kg/m²     Physical Exam  Vitals and nursing note reviewed.   Constitutional:       General: She is not in acute distress.     Appearance: Normal appearance. She is not ill-appearing.   HENT:      Head: Normocephalic.      Right Ear: External ear normal.      Left Ear: External ear normal.      Nose: Nose normal.      Mouth/Throat:      Mouth: Mucous membranes are moist.      Pharynx: Oropharynx is clear.   Eyes:      Extraocular Movements: Extraocular movements intact.

## 2024-01-31 ENCOUNTER — TELEPHONE (OUTPATIENT)
Dept: GASTROENTEROLOGY | Age: 74
End: 2024-01-31

## 2024-01-31 NOTE — TELEPHONE ENCOUNTER
Attempted to reach patient to schedule with Zhen SPRAGUE for postive FIT test. Unable to LVM, no mailbox.

## 2024-02-01 DIAGNOSIS — R91.1 LUNG NODULE: Primary | ICD-10-CM

## 2024-02-01 NOTE — PROGRESS NOTES
Order placed on 23 -new order requested by centralized scheduling so patient can schedule an appt.

## 2024-02-22 ENCOUNTER — HOSPITAL ENCOUNTER (OUTPATIENT)
Age: 74
End: 2024-02-22
Payer: MEDICARE

## 2024-02-22 ENCOUNTER — HOSPITAL ENCOUNTER (OUTPATIENT)
Dept: CT IMAGING | Age: 74
Discharge: HOME | End: 2024-02-24
Payer: MEDICARE

## 2024-02-22 DIAGNOSIS — R91.1 LUNG NODULE: ICD-10-CM

## 2024-02-22 PROCEDURE — 71250 CT THORAX DX C-: CPT

## 2024-02-26 ENCOUNTER — OFFICE VISIT (OUTPATIENT)
Dept: PULMONOLOGY | Age: 74
End: 2024-02-26
Payer: MEDICARE

## 2024-02-26 VITALS
DIASTOLIC BLOOD PRESSURE: 95 MMHG | BODY MASS INDEX: 28.27 KG/M2 | HEART RATE: 102 BPM | OXYGEN SATURATION: 98 % | SYSTOLIC BLOOD PRESSURE: 142 MMHG | WEIGHT: 144 LBS | HEIGHT: 60 IN | RESPIRATION RATE: 16 BRPM | TEMPERATURE: 97.6 F

## 2024-02-26 DIAGNOSIS — Z87.891 PERSONAL HISTORY OF TOBACCO USE: ICD-10-CM

## 2024-02-26 DIAGNOSIS — I10 ESSENTIAL HYPERTENSION: ICD-10-CM

## 2024-02-26 DIAGNOSIS — R91.1 LUNG NODULE: Primary | ICD-10-CM

## 2024-02-26 DIAGNOSIS — E66.3 OVERWEIGHT (BMI 25.0-29.9): ICD-10-CM

## 2024-02-26 DIAGNOSIS — E03.9 HYPOTHYROIDISM, UNSPECIFIED TYPE: ICD-10-CM

## 2024-02-26 PROCEDURE — 1123F ACP DISCUSS/DSCN MKR DOCD: CPT | Performed by: INTERNAL MEDICINE

## 2024-02-26 PROCEDURE — 3080F DIAST BP >= 90 MM HG: CPT | Performed by: INTERNAL MEDICINE

## 2024-02-26 PROCEDURE — 3077F SYST BP >= 140 MM HG: CPT | Performed by: INTERNAL MEDICINE

## 2024-02-26 PROCEDURE — 99213 OFFICE O/P EST LOW 20 MIN: CPT | Performed by: INTERNAL MEDICINE

## 2024-02-26 NOTE — PATIENT INSTRUCTIONS
SURVEY:    You may be receiving a survey from [facility] regarding your visit today.    Please complete the survey to enable us to provide the highest quality of care to you and your family.    If you cannot score us a very good on any question, please call the office to discuss how we could have made your experience a very good one.    Thank you.      Please recheck your blood pressure when you go home and make sure you take your prescribed medication if applicable .  Please follow up with your PCP or ER if needed.

## 2024-02-26 NOTE — PROGRESS NOTES
[facility], [facility] PUL PART OF [address] [address]  Dept: [phone]  Dept Fax: [phone]      2/26/24    Patient: Trang Ramon  YOB: 1950    Dear Ngozi, CHRISTIANO Shanks - CNP,    I had the pleasure of seeing one of your patients, TRANG RAMON today in the office today.  Please find attached my note with the assessment and plan of care.  Thank you for allowing me to participate in the care of this patient.  I will keep you updated on this patient's follow up and I look forward to serving you and your patients again in the future.    Robb Waddell MD  2/26/2024 9:19 AM

## 2024-02-26 NOTE — PROGRESS NOTES
PULMONARY OP  PROGRESS NOTE      Patient:  Trang Ramon  YOB: 1950    MRN: [id number]     Acct: [id number]       Pt seen and Chart reviewed.  Trang Ramon is here in followup for   1. Lung nodule    2. Essential hypertension    3. Hypothyroidism, unspecified type    4. Personal history of tobacco use    5. Overweight (BMI 25.0-29.9)          Pt  has not been hospitalizedor been to er since last visit.    Has been using meds as recommended.   Has a good appetite  No weight loss or gain  No shortness of breath or wheezing  No cough or sputum production  No bone pain or headaches or abdominal pain  Patient is exasperated about having the frequent CT scans of the chest and wants to have it only once in a year    Subjective:  Review of Systems    Review of Systems -   General ROS: Completed and except as mentioned above were negative   Psychological ROS:  Completed and except as mentioned above were negative  Ophthalmic ROS:  Completed and except as mentioned above were negative  ENT ROS:  Completed and except as mentioned above were negative  Allergy and Immunology ROS:  Completed and except as mentioned above werenegative  Hematological and Lymphatic ROS:  Completed and except as mentioned above were negative  Endocrine ROS: Completed and except as mentioned above were negative  Respiratory ROS:  Completed and except asmentioned above were negative  Cardiovascular ROS:  Completed and except as mentioned above were negative  Gastrointestinal ROS: Completed and except as mentioned above were negative  Genito-Urinary ROS:  Completedand except as mentioned above were negative  Musculoskeletal ROS:  Completed and except as mentioned above were negative  Neurological ROS:  Completed and except as mentioned above were negative  Dermatological ROS:Completed and except as mentioned above were negative    SLEEP  No epistaxis or sore throat. does not have fatigue, tiredness or sleepiness in am.  No MVA. No

## 2024-03-21 DIAGNOSIS — I10 ESSENTIAL HYPERTENSION: ICD-10-CM

## 2024-03-21 RX ORDER — HYDROCHLOROTHIAZIDE 12.5 MG/1
CAPSULE, GELATIN COATED ORAL
Qty: 90 CAPSULE | Refills: 1 | Status: SHIPPED | OUTPATIENT
Start: 2024-03-21

## 2024-03-21 NOTE — TELEPHONE ENCOUNTER
Bev SEXTON, APRN - CNP [facility]   2/17/2025  9:00 AM Robb Waddell MD [facility]            Patient Active Problem List:     Multinodular goiter     Hypertriglyceridemia     Chondromalacia of knee     Elevated blood pressure reading     Essential hypertension     Dyslipidemia     Vitamin D deficiency     Anxiety     Positive FIT (fecal immunochemical test)     Congenital hypothyroidism without goiter     Nodule of right lung

## 2024-04-17 PROBLEM — M25.569 KNEE PAIN: Status: ACTIVE | Noted: 2023-08-10

## 2024-04-17 PROBLEM — Z96.659 ARTIFICIAL KNEE JOINT PRESENT: Status: ACTIVE | Noted: 2023-06-01

## 2024-04-18 ENCOUNTER — TELEPHONE (OUTPATIENT)
Dept: GASTROENTEROLOGY | Age: 74
End: 2024-04-18

## 2024-04-18 ENCOUNTER — OFFICE VISIT (OUTPATIENT)
Dept: GASTROENTEROLOGY | Age: 74
End: 2024-04-18
Payer: MEDICARE

## 2024-04-18 ENCOUNTER — HOSPITAL ENCOUNTER (OUTPATIENT)
Age: 74
Discharge: HOME | End: 2024-04-18
Payer: MEDICARE

## 2024-04-18 VITALS
BODY MASS INDEX: 28.51 KG/M2 | RESPIRATION RATE: 18 BRPM | HEART RATE: 109 BPM | DIASTOLIC BLOOD PRESSURE: 86 MMHG | WEIGHT: 146 LBS | SYSTOLIC BLOOD PRESSURE: 142 MMHG | OXYGEN SATURATION: 97 %

## 2024-04-18 DIAGNOSIS — Z01.818 PRE-OP TESTING: ICD-10-CM

## 2024-04-18 DIAGNOSIS — R19.5 POSITIVE FIT (FECAL IMMUNOCHEMICAL TEST): ICD-10-CM

## 2024-04-18 DIAGNOSIS — Z12.11 COLON CANCER SCREENING: ICD-10-CM

## 2024-04-18 DIAGNOSIS — R19.5 POSITIVE FIT (FECAL IMMUNOCHEMICAL TEST): Primary | ICD-10-CM

## 2024-04-18 LAB
EKG ATRIAL RATE: 91 BPM
EKG P AXIS: 63 DEGREES
EKG P-R INTERVAL: 154 MS
EKG Q-T INTERVAL: 374 MS
EKG QRS DURATION: 86 MS
EKG QTC CALCULATION (BAZETT): 460 MS
EKG R AXIS: 0 DEGREES
EKG T AXIS: 40 DEGREES
EKG VENTRICULAR RATE: 91 BPM

## 2024-04-18 PROCEDURE — 3077F SYST BP >= 140 MM HG: CPT | Performed by: NURSE PRACTITIONER

## 2024-04-18 PROCEDURE — 3079F DIAST BP 80-89 MM HG: CPT | Performed by: NURSE PRACTITIONER

## 2024-04-18 PROCEDURE — 93005 ELECTROCARDIOGRAM TRACING: CPT

## 2024-04-18 PROCEDURE — 99203 OFFICE O/P NEW LOW 30 MIN: CPT | Performed by: NURSE PRACTITIONER

## 2024-04-18 PROCEDURE — 1123F ACP DISCUSS/DSCN MKR DOCD: CPT | Performed by: NURSE PRACTITIONER

## 2024-04-18 ASSESSMENT — ENCOUNTER SYMPTOMS: ALLERGIC/IMMUNOLOGIC NEGATIVE: 1

## 2024-04-18 NOTE — PROGRESS NOTES
[address] [address]    Chief Complaint   Patient presents with    Colonoscopy     Patient presents as new patient being referred by PCP to discuss screening colonoscopy because of a positive fit test 10/29/19. Patient has had colonoscopy in the past. Patient denies family history of colon cancer. Patient reports daily bowel movements with no issues or concerns. Patient denies abdominal pain. Denies Melena or hematochezia.         SILVANO Ramon is a 74 y.o. old female who has a past medical history of hypertension, hypertriglyceridemia, dyslipidemia, anxiety, IBS, GERD presenting as new GI referral with concerns for positive fit test in 2019.  According to patient, she has had no notable rectal bleeding.  She denies concerns with hemorrhoids at this time.  She reports that since she had her positive fit test in 2019 that she has done other stool test which were negative.  She is concerned about finances as she does use emergency patient assistance which is ending May 2024.  She denies a family history of colorectal cancer, she has had no unexpected weight loss, rectal bleeding, fevers or other systemic symptoms.      Family history of upper GI cancer:  No  Family history of colon cancer: No  Blood in stool: No.  No melena  Unintentional weight loss: No  Abdominal pain: No  Prior colonoscopy: Yes: over 10 years ago   Constipation history: No  Number of bowel movements a day: 1  Change in stool caliber: No  History of GERD or Acid Reflux: No      EXAMINATION:  WHOLE BODY PET/CT   9/29/2021   TECHNIQUE:  Following IV injection of 15.46 mCi of F 18 -FDG, PET  tumor imaging was  acquired from the base of the skull to the mid thighs.  Computed tomography  was used for purposes of attenuation correction and anatomic localization.  Fusion imaging was utilized for interpretation.   Uptake time 62 mins.  Glucose level 94 mg/dl.   COMPARISON:  Chest CT 08/27/2021   HISTORY:  ORDERING SYSTEM

## 2024-04-18 NOTE — TELEPHONE ENCOUNTER
Trang VINAY Yenny     1950        female    [address] [address]         [id number]           Legal Guardian no  If yes, Name:       Skilled Facility no     If yes, Name:                                             Home Phone: [phone]         Cell Phone:    Telephone Information:   Mobile [phone]                                           Surgeon: Dr. Gomez Surgery Date: 04/25/24                    Time:     Procedure: Colonoscopy  Duration:    Diagnosis: Positive Fit, Screening Colonoscopy  CPT Codes:    Important Medical History:  In Epic    First Assistant no  Special Inst/Equip/Implants: Regular    Nickel allergy  no  Latex Allergy: no      Cardiac Device:  No  If yes, need most recent pacemaker interrogation from Cardiologist:  Type of pacemaker:    Anesthesia:    MAC                       Admission Type:  Same Day                        Admit Prior to Day of Surgery: No    Case Location:  Ambulatory            Preadmission Testing:  Phone Call             PAT Date and Time: TBD    Need Preop Cardiac Clearance: no*  Need Pre-op/Medical Clearance:no    Does Patient have Cardiologist/physician? Name of Physician:    n/a    Special Needs Communication:  Parvin Lift no    needed no          Does patient sign for self yes*

## 2024-04-19 NOTE — PROGRESS NOTES
Patient states they received their colon prep instructions and home medications that are to be taken on the day of their procedure with a small sip of water only, from the physician's office. Instructed pt to stop taking OTC vitamins 7 days prior to surgery and to take amlodipine and synthroid with a small sip if water prior to arriving to the hospital the day of surgery.

## 2024-04-20 ASSESSMENT — ENCOUNTER SYMPTOMS
SHORTNESS OF BREATH: 0
COUGH: 0
TROUBLE SWALLOWING: 0
WHEEZING: 0

## 2024-04-23 ENCOUNTER — TELEPHONE (OUTPATIENT)
Dept: ONCOLOGY | Age: 74
End: 2024-04-23

## 2024-04-23 ENCOUNTER — TELEPHONE (OUTPATIENT)
Dept: GASTROENTEROLOGY | Age: 74
End: 2024-04-23

## 2024-04-23 NOTE — TELEPHONE ENCOUNTER
No VMB set up; attempting to reach patient to see if there are any questions about the prepping for her procedure this week.

## 2024-04-24 ENCOUNTER — ANESTHESIA EVENT (OUTPATIENT)
Dept: OPERATING ROOM | Age: 74
End: 2024-04-24
Payer: MEDICARE

## 2024-04-25 ENCOUNTER — HOSPITAL ENCOUNTER (OUTPATIENT)
Age: 74
Setting detail: OUTPATIENT SURGERY
Discharge: HOME | End: 2024-04-25
Attending: INTERNAL MEDICINE | Admitting: INTERNAL MEDICINE
Payer: MEDICARE

## 2024-04-25 ENCOUNTER — ANESTHESIA (OUTPATIENT)
Dept: OPERATING ROOM | Age: 74
End: 2024-04-25
Payer: MEDICARE

## 2024-04-25 VITALS
BODY MASS INDEX: 27.96 KG/M2 | DIASTOLIC BLOOD PRESSURE: 91 MMHG | HEIGHT: 60 IN | TEMPERATURE: 96.9 F | HEART RATE: 83 BPM | SYSTOLIC BLOOD PRESSURE: 150 MMHG | WEIGHT: 142.4 LBS | OXYGEN SATURATION: 98 % | RESPIRATION RATE: 18 BRPM

## 2024-04-25 DIAGNOSIS — R19.5 POSITIVE FIT (FECAL IMMUNOCHEMICAL TEST): ICD-10-CM

## 2024-04-25 PROCEDURE — 3609010600 HC COLONOSCOPY POLYPECTOMY SNARE/COLD BIOPSY: Performed by: INTERNAL MEDICINE

## 2024-04-25 PROCEDURE — 6360000002 HC RX W HCPCS: Performed by: NURSE ANESTHETIST, CERTIFIED REGISTERED

## 2024-04-25 PROCEDURE — 88305 TISSUE EXAM BY PATHOLOGIST: CPT

## 2024-04-25 PROCEDURE — 45385 COLONOSCOPY W/LESION REMOVAL: CPT | Performed by: INTERNAL MEDICINE

## 2024-04-25 PROCEDURE — 7100000010 HC PHASE II RECOVERY - FIRST 15 MIN: Performed by: INTERNAL MEDICINE

## 2024-04-25 PROCEDURE — 7100000011 HC PHASE II RECOVERY - ADDTL 15 MIN: Performed by: INTERNAL MEDICINE

## 2024-04-25 PROCEDURE — 3700000001 HC ADD 15 MINUTES (ANESTHESIA): Performed by: INTERNAL MEDICINE

## 2024-04-25 PROCEDURE — 2580000003 HC RX 258: Performed by: NURSE ANESTHETIST, CERTIFIED REGISTERED

## 2024-04-25 PROCEDURE — 2709999900 HC NON-CHARGEABLE SUPPLY: Performed by: INTERNAL MEDICINE

## 2024-04-25 PROCEDURE — 2500000003 HC RX 250 WO HCPCS: Performed by: NURSE ANESTHETIST, CERTIFIED REGISTERED

## 2024-04-25 PROCEDURE — 3700000000 HC ANESTHESIA ATTENDED CARE: Performed by: INTERNAL MEDICINE

## 2024-04-25 RX ORDER — SODIUM CHLORIDE 0.9 % (FLUSH) 0.9 %
5-40 SYRINGE (ML) INJECTION EVERY 12 HOURS SCHEDULED
Status: DISCONTINUED | OUTPATIENT
Start: 2024-04-25 | End: 2024-04-25 | Stop reason: HOSPADM

## 2024-04-25 RX ORDER — SODIUM CHLORIDE 0.9 % (FLUSH) 0.9 %
5-40 SYRINGE (ML) INJECTION PRN
Status: DISCONTINUED | OUTPATIENT
Start: 2024-04-25 | End: 2024-04-25 | Stop reason: HOSPADM

## 2024-04-25 RX ORDER — LIDOCAINE HYDROCHLORIDE 20 MG/ML
INJECTION, SOLUTION EPIDURAL; INFILTRATION; INTRACAUDAL; PERINEURAL PRN
Status: DISCONTINUED | OUTPATIENT
Start: 2024-04-25 | End: 2024-04-25 | Stop reason: SDUPTHER

## 2024-04-25 RX ORDER — SODIUM CHLORIDE 9 MG/ML
INJECTION, SOLUTION INTRAVENOUS PRN
Status: DISCONTINUED | OUTPATIENT
Start: 2024-04-25 | End: 2024-04-25 | Stop reason: HOSPADM

## 2024-04-25 RX ORDER — NALOXONE HYDROCHLORIDE 0.4 MG/ML
INJECTION, SOLUTION INTRAMUSCULAR; INTRAVENOUS; SUBCUTANEOUS PRN
Status: DISCONTINUED | OUTPATIENT
Start: 2024-04-25 | End: 2024-04-25 | Stop reason: HOSPADM

## 2024-04-25 RX ORDER — PROPOFOL 10 MG/ML
INJECTION, EMULSION INTRAVENOUS PRN
Status: DISCONTINUED | OUTPATIENT
Start: 2024-04-25 | End: 2024-04-25 | Stop reason: SDUPTHER

## 2024-04-25 RX ORDER — SODIUM CHLORIDE, SODIUM LACTATE, POTASSIUM CHLORIDE, CALCIUM CHLORIDE 600; 310; 30; 20 MG/100ML; MG/100ML; MG/100ML; MG/100ML
INJECTION, SOLUTION INTRAVENOUS CONTINUOUS
Status: DISCONTINUED | OUTPATIENT
Start: 2024-04-25 | End: 2024-04-25 | Stop reason: HOSPADM

## 2024-04-25 RX ADMIN — PROPOFOL 180 MCG/KG/MIN: 10 INJECTION, EMULSION INTRAVENOUS at 08:34

## 2024-04-25 RX ADMIN — SODIUM CHLORIDE, POTASSIUM CHLORIDE, SODIUM LACTATE AND CALCIUM CHLORIDE: 600; 310; 30; 20 INJECTION, SOLUTION INTRAVENOUS at 07:26

## 2024-04-25 RX ADMIN — LIDOCAINE HYDROCHLORIDE 100 MG: 20 INJECTION, SOLUTION EPIDURAL; INFILTRATION; INTRACAUDAL; PERINEURAL at 08:33

## 2024-04-25 RX ADMIN — PROPOFOL 50 MG: 10 INJECTION, EMULSION INTRAVENOUS at 08:33

## 2024-04-25 ASSESSMENT — PAIN - FUNCTIONAL ASSESSMENT
PAIN_FUNCTIONAL_ASSESSMENT: NONE - DENIES PAIN
PAIN_FUNCTIONAL_ASSESSMENT: FACE, LEGS, ACTIVITY, CRY, AND CONSOLABILITY (FLACC)
PAIN_FUNCTIONAL_ASSESSMENT: FACE, LEGS, ACTIVITY, CRY, AND CONSOLABILITY (FLACC)

## 2024-04-25 NOTE — ANESTHESIA PRE PROCEDURE
Department of Anesthesiology  Preprocedure Note       Name:  Trang Ramon   Age:  74 y.o.  :  1950                                          MRN:  [id number]         Date:  2024      Surgeon: Surgeon(s):  Samreen Gomez MD    Procedure: Procedure(s):  COLORECTAL CANCER SCREENING, NOT HIGH RISK    Medications prior to admission:   Prior to Admission medications    Medication Sig Start Date End Date Taking? Authorizing Provider   Omega-3 Fatty Acids (FISH OIL OMEGA-3 PO) Take by mouth    Monique Villalba MD   hydroCHLOROthiazide 12.5 MG capsule Take 1 capsule by mouth once daily in the morning 3/21/24   Bev Melvin APRN - CNP   silver sulfADIAZINE (SILVADENE) 1 % cream Apply topically twice a daily. 24   Bev Melvin APRN - CNP   amLODIPine (NORVASC) 5 MG tablet Take 1 tablet by mouth daily 23  Bev Melvin APRN - CNP   latanoprost (XALATAN) 0.005 % ophthalmic solution  10/9/21   Monique Villalba MD   acetaminophen (TYLENOL) 500 MG tablet Take 1 tablet by mouth every 6 hours as needed  Patient not taking: Reported on 2024    Monique Villalba MD   levothyroxine (SYNTHROID) 75 MCG tablet Takes 5  days/week 19   Monique Villalba MD       Current medications:    Current Facility-Administered Medications   Medication Dose Route Frequency Provider Last Rate Last Admin    lactated ringers IV soln infusion   IntraVENous Continuous Cesario Burgos APRN - CRNA 100 mL/hr at 24 0726 New Bag at 24 0726       Allergies:  No Known Allergies    Problem List:    Patient Active Problem List   Diagnosis Code    Multinodular goiter E04.2    Hypertriglyceridemia E78.1    Chondromalacia of knee M94.269    Elevated blood pressure reading R03.0    Essential hypertension I10    Dyslipidemia E78.5    Vitamin D deficiency E55.9    Anxiety F41.9    Positive FIT (fecal immunochemical test) R19.5    Congenital hypothyroidism without goiter E03.1    Nodule of

## 2024-04-25 NOTE — PROGRESS NOTES
Patient verbalizes readiness for discharge. Discharge instructions given to patient and responsible adult, answered all questions, and verbalized understanding of discharge instructions.         Discharge Criteria    Inpatients must meet Criteria 1 through 7. All other patients are either YES or N/A. If a NO is chosen then Anesthesia or Surgeon must be notified.      1.  Minimum 30 minutes after last dose of sedative medication.    Yes      2.  Systolic BP between 90 - 160. Diastolic BP between 60 - 90.    Yes      3.  Pulse between 60 - 120    Yes      4.  Respirations between 8 - 25.    Yes      5.  SpO2 92% - 100%.    Yes      6.  Able to cough and swallow or return to baseline function.    Yes      7.  Alert and oriented or return to baseline mental status.    Yes      8.  Demonstrates controlled, coordinated movements, ambulates with steady gait, or return to baseline activity function.    Yes      9.  Minimal or no pain or nausea, or at a level tolerable and acceptable to patient.    Yes      10. Takes and retains oral fluids as allowed.    Yes      11. Procedural / perioperative site stable.  Minimal or no bleeding.    Yes          12. If GI endoscopy procedure, minimal or no abdominal distention or passing flatus.    Yes      13. Written discharge instructions and emergency telephone number provided.    Yes      14. Accompanied by a responsible adult.    Yes

## 2024-04-25 NOTE — OP NOTE
[facility]    COLONOSCOPY    PROCEDURE DATE: 04/25/24    REFERRING PHYSICIAN: No ref. provider found     PRIMARY CARE PROVIDER: Bev Melvin, APRN - CNP    ATTENDING PHYSICIAN: TIGRE HANESN MD     HISTORY: Ms. Trang Ramon is a 74 y.o. female who presents to the  endoscopy unit for colonoscopy. The patient's clinical history is remarkable for hypertension, anxiety. She is currently medically stable and appropriate for the planned procedure.       PREOPERATIVE DIAGNOSIS: screening for colon cancer.     PROCEDURES:   Transanal Colonoscopy with polypectomy (snare cautery).     POSTPROCEDURE DIAGNOSIS:  Colon polyps  Diverticulosis    MEDICATIONS:   MAC per anesthesia     EBL 2 ml      INSTRUMENT: Olympus CF-H190 AL Pediatric flexible Colonoscope.     PREPARATION: The nature and character of the procedure as well as risks, benefits, and alternatives were discussed with the patient and informed consent was obtained. Complications were said to include, but were not limited to: medication allergy, medication reaction, cardiovascular and respiratory problems, bleeding, perforation, infection, and/or missed diagnosis. Following arrival in the endoscopy room, the patient was placed in the left lateral decubitus position and final time-out accomplished in the presence of the nursing staff. Baseline vital signs were obtained and reviewed, and IV sedation was subsequently initiated.       FINDINGS: Rectal examination demonstrated no significant visible external abnormality and digital palpation was unremarkable. Following adequate conscious sedation the colonoscope was introduced and advanced under direct visualization to the cecum, which was identified by the ileocecal valve and appendiceal orifice. The bowel preparation was felt to be good. Cecal intubation time was 6 minutes.     Once maximally inserted, the endoscope was withdrawn, fluid as well as gas was suctioned and the mucosa was carefully inspected. The  mucosal exam revealed 2 sessile polyps:  - Cecum: 8mm polypoid lesion removed with a hot snare  - Rectum: 7mm sessile polyp removed with a hot snare  Retroflexion was performed in the rectum and moderate internal hemorrhoids. Withdrawal time was 12 minutes.       IMPRESSION:   Colon polyps  Mild diverticulosis of sigmoid    RECOMMENDATIONS:   1) Follow up with referring provider, as previously scheduled.   2) Repeat Colonoscopy in 3-5 years  - await pathology      Electronically signed by TIGRE HANSEN MD on 4/25/2024 at 9:02 AM     This note is created with the assistance of a speech recognition program.  While intending to generate a document that actually reflects the content of the visit, the document can still have some errors including those of syntax and sound a like substitutions which may escape proof reading.  It such instances, actual meaning can be extrapolated by contextual diversion.

## 2024-04-25 NOTE — ANESTHESIA POSTPROCEDURE EVALUATION
Department of Anesthesiology  Postprocedure Note    Patient: Trang Ramon  MRN: [id number]  YOB: 1950  Date of evaluation: 4/25/2024    Procedure Summary       Date: 04/25/24 Room / Location: [address]    Anesthesia Start: 0830 Anesthesia Stop: 0858    Procedure: COLONOSCOPY POLYPECTOMY REMOVAL ABLATION Diagnosis:       Positive FIT (fecal immunochemical test)      (Positive FIT (fecal immunochemical test) [R19.5])    Surgeons: Samreen Gomez MD Responsible Provider: Cesario Burgos APRN - CRNA    Anesthesia Type: general ASA Status: 3            Anesthesia Type: No value filed.    Severo Phase I: Severo Score: 10    Severo Phase II: Severo Score: 10    Anesthesia Post Evaluation    Patient location during evaluation: PACU  Patient participation: complete - patient participated  Level of consciousness: awake  Airway patency: patent  Nausea & Vomiting: no vomiting and no nausea  Cardiovascular status: blood pressure returned to baseline and hemodynamically stable  Respiratory status: acceptable, spontaneous ventilation and room air  Hydration status: stable  Pain management: satisfactory to patient        No notable events documented.

## 2024-04-25 NOTE — H&P
History and Physical    Patient's Name/Date of Birth: Trang Ramon / 1950 (74 y.o.)    MRN: [id number]     Date: April 25, 2024       CHIEF COMPLAINT:  screening for colon cancer       Trang Ramon is a 74 y.o. old female who has a past medical history of hypertension, hypertriglyceridemia, dyslipidemia, anxiety, IBS, GERD presenting as new GI referral with concerns for positive fit test in 2019.  According to patient, she has had no notable rectal bleeding.  She denies concerns with hemorrhoids at this time.  She reports that since she had her positive fit test in 2019 that she has done other stool test which were negative.  She is concerned about finances as she does use emergency patient assistance which is ending May 2024.  She denies a family history of colorectal cancer, she has had no unexpected weight loss, rectal bleeding, fevers or other systemic symptoms.        Family history of upper GI cancer:  No  Family history of colon cancer: No  Blood in stool: No.  No melena  Unintentional weight loss: No  Abdominal pain: No  Prior colonoscopy: Yes: over 10 years ago   Constipation history: No  Number of bowel movements a day: 1  Change in stool caliber: No  History of GERD or Acid Reflux: No       Past Medical History:   Diagnosis Date    GERD (gastroesophageal reflux disease)     Hypertension      Past Surgical History:   Procedure Laterality Date    BREAST BIOPSY      TONSILLECTOMY      TUBAL LIGATION       Current Facility-Administered Medications   Medication Dose Route Frequency Provider Last Rate Last Admin    lactated ringers IV soln infusion   IntraVENous Continuous Cesario Burgos APRN - CRNA 100 mL/hr at 04/25/24 0726 New Bag at 04/25/24 0726     No Known Allergies  Family History   Problem Relation Age of Onset    High Blood Pressure Father     High Blood Pressure Sister     High Blood Pressure Other      Social History     Socioeconomic History    Marital status:      Spouse name: Not on file     Number of children: Not on file    Years of education: Not on file    Highest education level: Not on file   Occupational History    Not on file   Tobacco Use    Smoking status: Former     Current packs/day: 0.00     Average packs/day: 1 pack/day for 20.0 years (20.0 ttl pk-yrs)     Types: Cigarettes     Start date:      Quit date:      Years since quittin.3    Smokeless tobacco: Never   Vaping Use    Vaping Use: Never used   Substance and Sexual Activity    Alcohol use: No    Drug use: No    Sexual activity: Yes   Other Topics Concern    Not on file   Social History Narrative    Not on file     Social Determinants of Health     Financial Resource Strain: Low Risk  (2023)    Overall Financial Resource Strain (CARDIA)     Difficulty of Paying Living Expenses: Not hard at all   Food Insecurity: Not on file (2023)   Transportation Needs: Unknown (2023)    PRAPARE - Transportation     Lack of Transportation (Medical): Not on file     Lack of Transportation (Non-Medical): No   Physical Activity: Sufficiently Active (2023)    Exercise Vital Sign     Days of Exercise per Week: 6 days     Minutes of Exercise per Session: 50 min   Stress: Not on file   Social Connections: Not on file   Intimate Partner Violence: Not on file   Housing Stability: Unknown (2023)    Housing Stability Vital Sign     Unable to Pay for Housing in the Last Year: Not on file     Number of Places Lived in the Last Year: Not on file     Unstable Housing in the Last Year: No     ROS: Non-contributory    Physical Exam:  Vitals:    24 0710   BP: (!) 142/78   Pulse: 99   Resp: 14   Temp: 97.2 °F (36.2 °C)   SpO2: 95%       Chest: Breath sounds were clear and equal with no rales, wheezes, or rhonchi.  Respiratory effort was normal with no retractions or use of accessory muscles.    Cardiovascular: Heart sounds were normal with a regular rate and rhythm.  There were no murmurs, gallops or rubs.    Abdomen:

## 2024-04-29 LAB — SURGICAL PATHOLOGY REPORT: NORMAL

## 2024-05-15 ENCOUNTER — TELEPHONE (OUTPATIENT)
Dept: GASTROENTEROLOGY | Age: 74
End: 2024-05-15

## 2024-05-15 NOTE — TELEPHONE ENCOUNTER
----- Message from Samreen Gomez MD sent at 5/14/2024  5:12 PM EDT -----  Please notify patient:Tubular adenomas are on a pre-cancerous spectrum. Hyperplastic polyps are polyps which are not on a pre-cancerous spectrum. Repeat interval for colonoscopy is 5 years as the 7 year interval will place him at age 81.

## 2024-05-21 ENCOUNTER — OFFICE VISIT (OUTPATIENT)
Dept: PRIMARY CARE CLINIC | Age: 74
End: 2024-05-21
Payer: MEDICARE

## 2024-05-21 VITALS
OXYGEN SATURATION: 95 % | TEMPERATURE: 98.2 F | BODY MASS INDEX: 28.61 KG/M2 | WEIGHT: 146.5 LBS | DIASTOLIC BLOOD PRESSURE: 68 MMHG | HEART RATE: 91 BPM | SYSTOLIC BLOOD PRESSURE: 130 MMHG

## 2024-05-21 DIAGNOSIS — L01.00 IMPETIGO: Primary | ICD-10-CM

## 2024-05-21 PROCEDURE — 3078F DIAST BP <80 MM HG: CPT | Performed by: NURSE PRACTITIONER

## 2024-05-21 PROCEDURE — 3075F SYST BP GE 130 - 139MM HG: CPT | Performed by: NURSE PRACTITIONER

## 2024-05-21 PROCEDURE — 1123F ACP DISCUSS/DSCN MKR DOCD: CPT | Performed by: NURSE PRACTITIONER

## 2024-05-21 PROCEDURE — 99214 OFFICE O/P EST MOD 30 MIN: CPT | Performed by: NURSE PRACTITIONER

## 2024-05-21 RX ORDER — CLINDAMYCIN HYDROCHLORIDE 300 MG/1
300 CAPSULE ORAL 4 TIMES DAILY
Qty: 28 CAPSULE | Refills: 0 | Status: SHIPPED | OUTPATIENT
Start: 2024-05-21 | End: 2024-05-28

## 2024-05-21 RX ORDER — METHYLPREDNISOLONE 4 MG/1
TABLET ORAL
Qty: 1 TABLET | Refills: 0 | Status: SHIPPED | OUTPATIENT
Start: 2024-05-21 | End: 2024-05-27

## 2024-05-21 ASSESSMENT — ENCOUNTER SYMPTOMS
GASTROINTESTINAL NEGATIVE: 1
RESPIRATORY NEGATIVE: 1
EYES NEGATIVE: 1
ALLERGIC/IMMUNOLOGIC NEGATIVE: 1

## 2024-05-21 NOTE — PATIENT INSTRUCTIONS
SURVEY:     You may be receiving a survey from [facility] regarding your visit today.     Please complete the survey to enable us to provide the highest quality of care to you and your family.     If you cannot score us a very good on any question, please call the office to discuss how we could have made your experience a very good one.     Thank you,    Channing Knight, APRN-CNP  Bev Melvin, APRN-CNP  Neela, LPN  Shelley, CMA  Gee, CMA  Gerda, CMA  Shawna, PCA  Tavia, CMA  Katlyn, PM

## 2024-05-21 NOTE — PROGRESS NOTES
[facility]  BEV WHEELER CNP  [facility]  [address] [place] [address]  Dept: [phone]  Dept Fax: [phone]      Name: Trang Ramon  : 1950         Chief Complaint:     Chief Complaint   Patient presents with    Rash     Patient is here for rash on face and neck.       History of Present Illness:      Trang Ramon is a 74 y.o.  female who presents with Rash (Patient is here for rash on face and neck.)      SILVANO Costello is here today for a rash to her face and neck that started a week ago.  She states that it itches.  She was using some OTC clotrimazole on it.  She thought it was ringworm at one time and has used some Lotrin on it.  She feels like it has made it better.  Benadryl has helped the itch.  She has had some drainage to the rash that is yellow.  She thinks the drainage has slowed down since putting the lotrin on it.      Past Medical History:     Past Medical History:   Diagnosis Date    GERD (gastroesophageal reflux disease)     Hypertension       Reviewed all health maintenance requirements and ordered appropriate tests  Health Maintenance Due   Topic Date Due    Annual Wellness Visit (Medicare Advantage)  2024       Past Surgical History:     Past Surgical History:   Procedure Laterality Date    BREAST BIOPSY      COLONOSCOPY  2024    -polyps(tubular adenoma,hyperplastic),diverticulosis    COLONOSCOPY N/A 2024    COLONOSCOPY POLYPECTOMY SNARE BIOPSY performed by Samreen Gomez MD at [facility] OR    TONSILLECTOMY      TUBAL LIGATION          Medications:       Prior to Admission medications    Medication Sig Start Date End Date Taking? Authorizing Provider   clindamycin (CLEOCIN) 300 MG capsule Take 1 capsule by mouth 4 times daily for 7 days 24 Yes Bev Wheeler APRN - CNP   methylPREDNISolone (MEDROL DOSEPACK) 4 MG tablet Take by mouth. 24 Yes Bev Wheeler APRN - CNP   Omega-3 Fatty Acids

## 2024-08-13 ENCOUNTER — OFFICE VISIT (OUTPATIENT)
Dept: PRIMARY CARE CLINIC | Age: 74
End: 2024-08-13
Payer: MEDICARE

## 2024-08-13 VITALS
RESPIRATION RATE: 16 BRPM | OXYGEN SATURATION: 97 % | BODY MASS INDEX: 28.4 KG/M2 | DIASTOLIC BLOOD PRESSURE: 86 MMHG | HEART RATE: 100 BPM | SYSTOLIC BLOOD PRESSURE: 122 MMHG | WEIGHT: 145.4 LBS | TEMPERATURE: 98.4 F

## 2024-08-13 DIAGNOSIS — Z12.31 OTHER SCREENING MAMMOGRAM: ICD-10-CM

## 2024-08-13 DIAGNOSIS — L03.115 CELLULITIS OF RIGHT LOWER EXTREMITY: Primary | ICD-10-CM

## 2024-08-13 PROCEDURE — 99214 OFFICE O/P EST MOD 30 MIN: CPT | Performed by: NURSE PRACTITIONER

## 2024-08-13 PROCEDURE — 3079F DIAST BP 80-89 MM HG: CPT | Performed by: NURSE PRACTITIONER

## 2024-08-13 PROCEDURE — 3074F SYST BP LT 130 MM HG: CPT | Performed by: NURSE PRACTITIONER

## 2024-08-13 PROCEDURE — 1123F ACP DISCUSS/DSCN MKR DOCD: CPT | Performed by: NURSE PRACTITIONER

## 2024-08-13 RX ORDER — CEPHALEXIN 500 MG/1
500 CAPSULE ORAL 4 TIMES DAILY
Qty: 40 CAPSULE | Refills: 0 | Status: SHIPPED | OUTPATIENT
Start: 2024-08-13 | End: 2024-08-23

## 2024-08-13 ASSESSMENT — PATIENT HEALTH QUESTIONNAIRE - PHQ9
SUM OF ALL RESPONSES TO PHQ QUESTIONS 1-9: 0
2. FEELING DOWN, DEPRESSED OR HOPELESS: NOT AT ALL
SUM OF ALL RESPONSES TO PHQ9 QUESTIONS 1 & 2: 0
1. LITTLE INTEREST OR PLEASURE IN DOING THINGS: NOT AT ALL

## 2024-08-13 ASSESSMENT — ENCOUNTER SYMPTOMS
ALLERGIC/IMMUNOLOGIC NEGATIVE: 1
GASTROINTESTINAL NEGATIVE: 1
RESPIRATORY NEGATIVE: 1

## 2024-08-13 NOTE — PROGRESS NOTES
[facility]  GUI WHEELER, MEGA  [facility]  [address] [address]  Dept: [phone]  Dept Fax: [phone]      Name: Trang Ramon  : 1950         Chief Complaint:     Chief Complaint   Patient presents with    Abrasion     Patient c/o scratches on her right shin area that happened about a week ago. Patient scratched her leg on twigs in the yard.        History of Present Illness:      Trang Ramon is a 74 y.o.  female who presents with Abrasion (Patient c/o scratches on her right shin area that happened about a week ago. Patient scratched her leg on twigs in the yard. )      SILVANO Costello is here today for cellulitis to her right lower leg.  She cut her leg with some twigs while camping 3 weeks ago.  Since the cut she has had increased redness and area warm to touch.  She states that she has been trying multiple OTC remedies to keep from coming in today.  The redness to the area has worsened.  She has had an improvement in the drainage she was having.  Some mild swelling.  She has been using tea tree oil and iodine oil on the area.  No fever.      Past Medical History:     Past Medical History:   Diagnosis Date    GERD (gastroesophageal reflux disease)     Hypertension       Reviewed all health maintenance requirements and ordered appropriate tests  Health Maintenance Due   Topic Date Due    Annual Wellness Visit (Medicare Advantage)  2024    Breast cancer screen  2024    Flu vaccine (1) 2024       Past Surgical History:     Past Surgical History:   Procedure Laterality Date    BREAST BIOPSY      COLONOSCOPY  2024    -polyps(tubular adenoma,hyperplastic),diverticulosis    COLONOSCOPY N/A 2024    COLONOSCOPY POLYPECTOMY SNARE BIOPSY performed by Samreen Gomez MD at [facility] OR    TONSILLECTOMY      TUBAL LIGATION          Medications:       Prior to Admission medications    Medication Sig Start Date End Date Taking?

## 2024-08-13 NOTE — PATIENT INSTRUCTIONS
SURVEY:     You may be receiving a survey from [facility] regarding your visit today.     Please complete the survey to enable us to provide the highest quality of care to you and your family.     If you cannot score us a very good on any question, please call the office to discuss how we could have made your experience a very good one.     Thank you,    Channing Knight, APRN-CNP  Bev Melvin, APRN-CNP  Neela, LPN  Shelley, CMA  Gee, CMA  Gerda, CMA  Shawna, PCA  Tavia, CMA  Katlyn, PM

## 2024-09-16 DIAGNOSIS — I10 ESSENTIAL HYPERTENSION: ICD-10-CM

## 2024-09-16 RX ORDER — AMLODIPINE BESYLATE 5 MG/1
5 TABLET ORAL DAILY
Qty: 90 TABLET | Refills: 1 | Status: SHIPPED | OUTPATIENT
Start: 2024-09-16

## 2024-09-16 RX ORDER — HYDROCHLOROTHIAZIDE 12.5 MG/1
CAPSULE ORAL
Qty: 90 CAPSULE | Refills: 1 | Status: SHIPPED | OUTPATIENT
Start: 2024-09-16

## 2024-09-24 ENCOUNTER — TELEPHONE (OUTPATIENT)
Dept: PRIMARY CARE CLINIC | Age: 74
End: 2024-09-24

## 2024-09-30 ENCOUNTER — OFFICE VISIT (OUTPATIENT)
Dept: PRIMARY CARE CLINIC | Age: 74
End: 2024-09-30

## 2024-09-30 VITALS
SYSTOLIC BLOOD PRESSURE: 134 MMHG | TEMPERATURE: 98.9 F | BODY MASS INDEX: 29.49 KG/M2 | WEIGHT: 150.2 LBS | RESPIRATION RATE: 16 BRPM | DIASTOLIC BLOOD PRESSURE: 86 MMHG | OXYGEN SATURATION: 96 % | HEART RATE: 95 BPM | HEIGHT: 60 IN

## 2024-09-30 DIAGNOSIS — Z00.00 MEDICARE ANNUAL WELLNESS VISIT, SUBSEQUENT: Primary | ICD-10-CM

## 2024-09-30 DIAGNOSIS — I10 ESSENTIAL HYPERTENSION: ICD-10-CM

## 2024-09-30 DIAGNOSIS — E78.5 HYPERLIPIDEMIA, UNSPECIFIED HYPERLIPIDEMIA TYPE: ICD-10-CM

## 2024-09-30 SDOH — ECONOMIC STABILITY: FOOD INSECURITY: WITHIN THE PAST 12 MONTHS, YOU WORRIED THAT YOUR FOOD WOULD RUN OUT BEFORE YOU GOT MONEY TO BUY MORE.: NEVER TRUE

## 2024-09-30 SDOH — ECONOMIC STABILITY: FOOD INSECURITY: WITHIN THE PAST 12 MONTHS, THE FOOD YOU BOUGHT JUST DIDN'T LAST AND YOU DIDN'T HAVE MONEY TO GET MORE.: NEVER TRUE

## 2024-09-30 SDOH — ECONOMIC STABILITY: INCOME INSECURITY: HOW HARD IS IT FOR YOU TO PAY FOR THE VERY BASICS LIKE FOOD, HOUSING, MEDICAL CARE, AND HEATING?: NOT HARD AT ALL

## 2024-09-30 ASSESSMENT — PATIENT HEALTH QUESTIONNAIRE - PHQ9
SUM OF ALL RESPONSES TO PHQ QUESTIONS 1-9: 0
2. FEELING DOWN, DEPRESSED OR HOPELESS: NOT AT ALL
1. LITTLE INTEREST OR PLEASURE IN DOING THINGS: NOT AT ALL
SUM OF ALL RESPONSES TO PHQ QUESTIONS 1-9: 0
SUM OF ALL RESPONSES TO PHQ9 QUESTIONS 1 & 2: 0
SUM OF ALL RESPONSES TO PHQ QUESTIONS 1-9: 0
SUM OF ALL RESPONSES TO PHQ QUESTIONS 1-9: 0

## 2024-09-30 NOTE — PROGRESS NOTES
Medicare Annual Wellness Visit    Trang Ramon is here for Medicare AWV    Assessment & Plan   Medicare annual wellness visit, subsequent  Essential hypertension  -     Comprehensive Metabolic Panel, Fasting; Future  Hyperlipidemia, unspecified hyperlipidemia type  -     Lipid Panel; Future    Recommendations for Preventive Services Due: see orders and patient instructions/AVS.  Recommended screening schedule for the next 5-10 years is provided to the patient in written form: see Patient Instructions/AVS.     Return in 1 year (on 9/30/2025) for Medicare AWV.     Subjective   The following acute and/or chronic problems were also addressed today: Denies any new medical concerns today.    See's Endocrinologist once a year for her Synthroid management.      Patient's complete Health Risk Assessment and screening values have been reviewed and are found in Flowsheets. The following problems were reviewed today and where indicated follow up appointments were made and/or referrals ordered.    Positive Risk Factor Screenings with Interventions:                   Dentist Screen:  Have you seen the dentist within the past year?: (!) No    Intervention:  Patient declines any further evaluation or treatment        Advanced Directives:  Do you have a Living Will?: (!) No    Intervention:  has NO advanced directive - information provided          Objective   Vitals:    09/30/24 0945   BP: 134/86   Pulse: 95   Resp: 16   Temp: 98.9 °F (37.2 °C)   TempSrc: Temporal   SpO2: 96%   Weight: 68.1 kg (150 lb 3.2 oz)   Height: 1.524 m (5')      Body mass index is 29.33 kg/m².      General Appearance: alert and oriented to person, place and time, well developed and well- nourished, in no acute distress  Skin: warm and dry, no rash or erythema  Head: normocephalic and atraumatic  Eyes: pupils equal, round, and reactive to light, extraocular eye movements intact, conjunctivae normal  ENT: tympanic membrane, external ear and ear canal normal 
not applicable (Male)

## 2024-09-30 NOTE — PATIENT INSTRUCTIONS
SURVEY:     You may be receiving a survey from [facility] regarding your visit today.     Please complete the survey to enable us to provide the highest quality of care to you and your family.     If you cannot score us a very good on any question, please call the office to discuss how we could have made your experience a very good one.     Thank you,    Channing Knight, APRN-CNP  Bev Melvin, APRN-CNP  Neela, LPN  Shelley, CMA  Gee, CMA  Gerda, CMA  Shawna, PCA  Tavia, CMA  Katlyn, PM           Learning About Dental Care for Older Adults  Dental care for older adults: Overview  Dental care for older people is much the same as for younger adults. But older adults do have concerns that younger adults do not. Older adults may have problems with gum disease and decay on the roots of their teeth. They may need missing teeth replaced or broken fillings fixed. Or they may have dentures that need to be cared for. Some older adults may have trouble holding a toothbrush.  You can help remind the person you are caring for to brush and floss their teeth or to clean their dentures. In some cases, you may need to do the brushing and other dental care tasks. People who have trouble using their hands or who have dementia may need this extra help.  How can you help with dental care?  Normal dental care  To keep the teeth and gums healthy:  Brush the teeth with fluoride toothpaste twice a day--in the morning and at night--and floss at least once a day. Plaque can quickly build up on the teeth of older adults.  Watch for the signs of gum disease. These signs include gums that bleed after brushing or after eating hard foods, such as apples.  See a dentist regularly. Many experts recommend checkups every 6 months.  Keep the dentist up to date on any new medications the person is taking.  Encourage a balanced diet that includes whole grains, vegetables, and fruits, and that is low in saturated fat and sodium.  Encourage the person

## 2024-10-01 ENCOUNTER — TELEPHONE (OUTPATIENT)
Dept: PRIMARY CARE CLINIC | Age: 74
End: 2024-10-01

## 2024-10-01 LAB
A/G RATIO: 1.5 RATIO (ref 1–2.5)
ALBUMIN: 4.5 G/DL (ref 3.5–5.2)
ALK PHOSPHATASE: 74 U/L (ref 30–146)
ALT SERPL-CCNC: 19 U/L (ref 5–33)
AST SERPL-CCNC: 21 U/L (ref 9–40)
BILIRUB SERPL-MCNC: 0.2 MG/DL
BUN BLDV-MCNC: 10 MG/DL (ref 8–23)
CALCIUM SERPL-MCNC: 9.6 MG/DL (ref 8.6–10.5)
CHLORIDE BLD-SCNC: 101 MMOL/L (ref 96–107)
CHOLESTEROL, TOTAL: 242 MG/DL (ref 100–199)
CHOLESTEROL/HDL RATIO: 4.7 (ref 2–4.5)
CO2: 28 MMOL/L (ref 18–32)
CREAT SERPL-MCNC: 0.62 MG/DL (ref 0.51–1.15)
EGFR IF NONAFRICAN AMERICAN: 95 ML/MIN/1.73M2
GLOBULIN: 3 G/DL (ref 1.8–3.8)
GLUCOSE: 93 MG/DL (ref 70–100)
HDLC SERPL-MCNC: 51 MG/DL
LDL CHOLESTEROL: 139 MG/DL
LDL/HDL RATIO: 2.7
POTASSIUM SERPL-SCNC: 3.8 MMOL/L (ref 3.5–5.4)
SODIUM BLD-SCNC: 143 MMOL/L (ref 135–148)
TOTAL PROTEIN: 7.5 G/DL (ref 6–8.3)
TRIGL SERPL-MCNC: 262 MG/DL (ref 20–149)
VLDLC SERPL CALC-MCNC: 52 MG/DL

## 2024-10-01 NOTE — TELEPHONE ENCOUNTER
----- Message from CHRISTIANO Stock CNP sent at 10/1/2024  8:55 AM EDT -----  Please notify patient of stable lab results.  Her Cholesterol and her lipids are elevated.  I would recommend a low dose statin medication.  Ask her if she would be willing to take?  Thanks Bev

## 2024-10-01 NOTE — TELEPHONE ENCOUNTER
Patient notified and verbalized understanding. Patient does not want to start a statin medication at this time.

## 2024-10-23 ENCOUNTER — TELEPHONE (OUTPATIENT)
Dept: PRIMARY CARE CLINIC | Age: 74
End: 2024-10-23

## 2025-01-29 ENCOUNTER — HOSPITAL ENCOUNTER (OUTPATIENT)
Dept: WOMENS IMAGING | Age: 75
Discharge: HOME | End: 2025-01-31
Payer: MEDICARE

## 2025-01-29 ENCOUNTER — TELEPHONE (OUTPATIENT)
Dept: PRIMARY CARE CLINIC | Age: 75
End: 2025-01-29

## 2025-01-29 DIAGNOSIS — Z12.31 OTHER SCREENING MAMMOGRAM: ICD-10-CM

## 2025-01-29 PROCEDURE — 77063 BREAST TOMOSYNTHESIS BI: CPT

## 2025-01-29 NOTE — TELEPHONE ENCOUNTER
Attempted to contact patient with normal mammogram results. Patient did not answer and no VM available.

## 2025-01-29 NOTE — TELEPHONE ENCOUNTER
----- Message from CHRISTIANO Stock CNP sent at 1/29/2025  2:28 PM EST -----  Please notify patient their Mammogram was normal.  Recommend yearly mammogram screenings.

## 2025-02-01 DIAGNOSIS — I10 ESSENTIAL HYPERTENSION: ICD-10-CM

## 2025-02-03 RX ORDER — HYDROCHLOROTHIAZIDE 12.5 MG/1
CAPSULE ORAL
Qty: 90 CAPSULE | Refills: 0 | Status: SHIPPED | OUTPATIENT
Start: 2025-02-03

## 2025-02-03 RX ORDER — AMLODIPINE BESYLATE 5 MG/1
5 TABLET ORAL DAILY
Qty: 90 TABLET | Refills: 0 | Status: SHIPPED | OUTPATIENT
Start: 2025-02-03

## 2025-02-14 ENCOUNTER — HOSPITAL ENCOUNTER (OUTPATIENT)
Dept: CT IMAGING | Age: 75
Discharge: HOME | End: 2025-02-16
Payer: MEDICARE

## 2025-02-14 DIAGNOSIS — R91.1 LUNG NODULE: ICD-10-CM

## 2025-02-14 PROCEDURE — 71250 CT THORAX DX C-: CPT

## 2025-02-17 ENCOUNTER — OFFICE VISIT (OUTPATIENT)
Dept: PULMONOLOGY | Age: 75
End: 2025-02-17
Payer: MEDICARE

## 2025-02-17 VITALS
HEART RATE: 91 BPM | WEIGHT: 151.5 LBS | BODY MASS INDEX: 29.74 KG/M2 | TEMPERATURE: 96.8 F | DIASTOLIC BLOOD PRESSURE: 82 MMHG | SYSTOLIC BLOOD PRESSURE: 131 MMHG | OXYGEN SATURATION: 97 % | RESPIRATION RATE: 20 BRPM | HEIGHT: 60 IN

## 2025-02-17 DIAGNOSIS — E66.3 OVERWEIGHT (BMI 25.0-29.9): ICD-10-CM

## 2025-02-17 DIAGNOSIS — E03.9 HYPOTHYROIDISM, UNSPECIFIED TYPE: ICD-10-CM

## 2025-02-17 DIAGNOSIS — R91.1 LUNG NODULE: Primary | ICD-10-CM

## 2025-02-17 DIAGNOSIS — I10 ESSENTIAL HYPERTENSION: ICD-10-CM

## 2025-02-17 DIAGNOSIS — Z87.891 PERSONAL HISTORY OF TOBACCO USE: ICD-10-CM

## 2025-02-17 PROCEDURE — 99213 OFFICE O/P EST LOW 20 MIN: CPT | Performed by: INTERNAL MEDICINE

## 2025-02-17 PROCEDURE — 3079F DIAST BP 80-89 MM HG: CPT | Performed by: INTERNAL MEDICINE

## 2025-02-17 PROCEDURE — 1123F ACP DISCUSS/DSCN MKR DOCD: CPT | Performed by: INTERNAL MEDICINE

## 2025-02-17 PROCEDURE — 3075F SYST BP GE 130 - 139MM HG: CPT | Performed by: INTERNAL MEDICINE

## 2025-02-17 PROCEDURE — 1159F MED LIST DOCD IN RCRD: CPT | Performed by: INTERNAL MEDICINE

## 2025-02-17 NOTE — PROGRESS NOTES
PULMONARY OP  PROGRESS NOTE      Patient:  Trang Ramon  YOB: 1950    MRN: [id number]     Acct: [id number]       Pt seen and Chart reviewed.  Trang Ramon is here in followup for   1. Lung nodule    2. Essential hypertension    3. Hypothyroidism, unspecified type    4. Personal history of tobacco use    5. Overweight (BMI 25.0-29.9)          History of Present Illness  The patient is a 75-year-old female being seen in follow-up for a lung nodule, essential hypertension, hypothyroidism, personal history of tobacco use, and being overweight.    She reports no significant weight loss or loss of appetite since her last visit in February 2024. She has been experiencing a cough, which she attributes to a recent cold, but does not report any expectoration of phlegm or blood. She also does not experience any chest pain, pressure, bone pain, headaches, or abdominal pain.    She has a history of smoking, with a reported consumption of half a pack per day from 1972 to 1992, although she notes that there were periods within this timeframe where she did not smoke.    She has not received the influenza vaccine for the current year and expresses disinterest in receiving both the influenza and pneumonia vaccines. She believes she may have contracted COVID-19, as she experienced a cough and chest congestion, but does not report any severe symptoms.    SOCIAL HISTORY  The patient quit smoking in 1992 after smoking half a pack a day for approximately 20 years.    IMMUNIZATIONS  She declined influenza and pneumonia vaccines.         Review of Systems -   Constitutional ROS: negative  ENT ROS: negative  Allergy and Immunology ROS: negative  Respiratory ROS: negative  Cardiovascular ROS: negative  Gastrointestinal ROS: negative  Musculoskeletal ROS: negative         Allergies:  No Known Allergies    Medications:    Current Outpatient Medications:     amLODIPine (NORVASC) 5 MG tablet, Take 1 tablet by mouth once daily, Disp:

## 2025-02-17 NOTE — PATIENT INSTRUCTIONS
SURVEY:    Thank you for allowing us to care for you today.    You may be receiving a survey from [facility] regarding your visit today- electronically or via mail.      Please help us by completing the survey as this will provide the needed feedback to ensure we are providing the very best care for you and your family.    If you cannot score us a very good on any question, please call the office to discuss how we could have made your experience a very good one.    Thank you.       STAFF:    Rosalva Ibanez, Valeria TAO      CLINICAL STAFF:    Katina HOGUE, Maria A TAO, Joy TAO, Naty HOGUE

## 2025-03-13 DIAGNOSIS — I10 ESSENTIAL HYPERTENSION: ICD-10-CM

## 2025-03-13 RX ORDER — AMLODIPINE BESYLATE 5 MG/1
5 TABLET ORAL DAILY
Qty: 90 TABLET | Refills: 1 | Status: SHIPPED | OUTPATIENT
Start: 2025-03-13

## 2025-03-13 RX ORDER — HYDROCHLOROTHIAZIDE 12.5 MG/1
CAPSULE ORAL
Qty: 90 CAPSULE | Refills: 1 | Status: SHIPPED | OUTPATIENT
Start: 2025-03-13

## 2025-03-13 NOTE — TELEPHONE ENCOUNTER
Health Maintenance   Topic Date Due    Respiratory Syncytial Virus (RSV) Pregnant or age 60 yrs+ (1 - Risk 60-74 years 1-dose series) Never done    COVID-19 Vaccine (5 - 2024-25 season) 09/01/2024    Annual Wellness Visit (Medicare Advantage)  01/01/2025    DTaP/Tdap/Td vaccine (1 - Tdap) 09/30/2025 (Originally 2/7/1969)    Flu vaccine (1) 09/30/2025 (Originally 8/1/2024)    Pneumococcal 50+ years Vaccine (2 of 2 - PCV) 09/30/2025 (Originally 2/7/2018)    Hepatitis C screen  09/30/2025 (Originally [birth year]7/1970)    Depression Screen  09/30/2025    Breast cancer screen  01/29/2027    Colorectal Cancer Screen  04/25/2029    Lipids  09/30/2029    DEXA (modify frequency per FRAX score)  Completed    Shingles vaccine  Completed    Hepatitis A vaccine  Aged Out    Hepatitis B vaccine  Aged Out    Hib vaccine  Aged Out    Polio vaccine  Aged Out    Meningococcal (ACWY) vaccine  Aged Out    Meningococcal B vaccine  Aged Out             (applicable per patient's age: Cancer Screenings, Depression Screening, Fall Risk Screening, Immunizations)    AST (U/L)   Date Value   09/30/2024 21     ALT (U/L)   Date Value   09/30/2024 19     BUN (mg/dL)   Date Value   09/30/2024 10      (goal A1C is < 7)   (goal LDL is <100) need 30-50% reduction from baseline     BP Readings from Last 3 Encounters:   02/17/25 131/82   09/30/24 134/86   08/13/24 122/86    (goal /80)      All Future Testing planned in [facility]:  Lab Frequency Next Occurrence   COLONOSCOPY (Screening) Once 04/18/2024   Comprehensive Metabolic Panel, Fasting Once 09/30/2024   Lipid Panel Once 10/07/2024       Next Visit Date:  Future Appointments   Date Time Provider Department Center   10/1/2025  9:00 AM Bev Melvin, APRN - CNP [facility]            Patient Active Problem List:     Multinodular goiter     Hypertriglyceridemia     Chondromalacia of knee     Elevated blood pressure reading     Essential hypertension     Dyslipidemia     Vitamin D

## (undated) DEVICE — TUBING SUCT NON-STRL 9/32X100 W/CNNT

## (undated) DEVICE — TRAP SURG QUAD PARABOLA SLOT DSGN SFTY SCRN TRAPEASE

## (undated) DEVICE — PAD ADH AD ELECTRD 2 PLT W 5M CRD

## (undated) DEVICE — CANNULA ORAL NSL AD CO2 N INTUB O2 DEL DISP TRU LNK

## (undated) DEVICE — SOLUTION IRRIG 1000ML 0.9% SOD CHL USP POUR PLAS BTL

## (undated) DEVICE — ACUSNARE POLYPECTOMY SNARE: Brand: ACUSNARE